# Patient Record
Sex: FEMALE | Race: BLACK OR AFRICAN AMERICAN | Employment: FULL TIME | URBAN - METROPOLITAN AREA
[De-identification: names, ages, dates, MRNs, and addresses within clinical notes are randomized per-mention and may not be internally consistent; named-entity substitution may affect disease eponyms.]

---

## 2017-01-16 ENCOUNTER — HOSPITAL ENCOUNTER (OUTPATIENT)
Dept: LAB | Age: 32
Discharge: HOME | End: 2017-01-16

## 2017-01-16 ENCOUNTER — OFFICE VISIT (OUTPATIENT)
Dept: INTERNAL MEDICINE CLINIC | Age: 32
End: 2017-01-16

## 2017-01-16 VITALS
BODY MASS INDEX: 31.08 KG/M2 | OXYGEN SATURATION: 100 % | HEART RATE: 73 BPM | HEIGHT: 67 IN | SYSTOLIC BLOOD PRESSURE: 134 MMHG | TEMPERATURE: 98 F | WEIGHT: 198 LBS | RESPIRATION RATE: 18 BRPM | DIASTOLIC BLOOD PRESSURE: 91 MMHG

## 2017-01-16 DIAGNOSIS — L02.91 ABSCESS: Primary | ICD-10-CM

## 2017-01-16 DIAGNOSIS — R03.0 ELEVATED BP WITHOUT DIAGNOSIS OF HYPERTENSION: ICD-10-CM

## 2017-01-16 DIAGNOSIS — N92.6 IRREGULAR MENSES: ICD-10-CM

## 2017-01-16 PROCEDURE — 99001 SPECIMEN HANDLING PT-LAB: CPT | Performed by: FAMILY MEDICINE

## 2017-01-16 NOTE — MR AVS SNAPSHOT
Visit Information Date & Time Provider Department Dept. Phone Encounter #  
 1/16/2017  2:00 PM Wang Tracy[facility]  [id number] Upcoming Health Maintenance Date Due DTaP/Tdap/Td series (1 - Tdap) 11/18/2005 PAP AKA CERVICAL CYTOLOGY 1/12/2019 Allergies as of 1/16/2017  Review Complete On: 1/16/2017 By: Wang Tracy MD  
 No Known Allergies Current Immunizations  Never Reviewed No immunizations on file. Not reviewed this visit You Were Diagnosed With   
  
 Codes Comments Abscess    -  Primary ICD-10-CM: L02.91 
ICD-9-CM: 682.9 Elevated BP without diagnosis of hypertension     ICD-10-CM: R03.0 ICD-9-CM: 796.2 Irregular menses     ICD-10-CM: N92.6 ICD-9-CM: 626.4 Vitals BP Pulse Temp Resp Height(growth percentile) Weight(growth percentile) (!) 134/91 (BP 1 Location: Left arm, BP Patient Position: Sitting) 73 98 °F (36.7 °C) (Oral) 18 5' 7\" (1.702 m) 198 lb (89.8 kg) SpO2 BMI OB Status Smoking Status 100% 31.01 kg/m2 Having regular periods Never Smoker Vitals History BMI and BSA Data Body Mass Index Body Surface Area 31.01 kg/m 2 2.06 m 2 Preferred Pharmacy Pharmacy Name Phone [facility] [address] [place] [address] [place] [phone] Your Updated Medication List  
  
   
This list is accurate as of: 1/16/17  2:26 PM.  Always use your most recent med list.  
  
  
  
  
 ethinyl estradiol-etonogestrel 0.12-0.015 mg/24 hr vaginal ring Commonly known as:  [place] Insert for 4 weeks and then remove and insert new ring We Performed the Following HEMOGLOBIN A1C WITH EAG [22655 CPT(R)] METABOLIC PANEL, BASIC [08786 CPT(R)] TSH 3RD GENERATION [01613 CPT(R)] Introducing [facility]!    
 Isabella Velasquez introduces [employer] patient portal. Now you can access parts of your medical record, email your doctor's office, and request medication refills online. 1. In your internet browser, go to [email or web address] 2. Click on the First Time User? Click Here link in the Sign In box. You will see the New Member Sign Up page. 3. Enter your [employer] Access Code exactly as it appears below. You will not need to use this code after youve completed the sign-up process. If you do not sign up before the expiration date, you must request a new code. · [employer] Access Code: [email or web address] Expires: 4/16/2017  1:46 PM 
 
4. Enter the last four digits of your Social Security Number (xxxx) and Date of Birth (mm/dd/yyyy) as indicated and click Submit. You will be taken to the next sign-up page. 5. Create a [employer]Room [employer] ID. This will be your [employer] login ID and cannot be changed, so think of one that is secure and easy to remember. 6. Create a [employer] password. You can change your password at any time. 7. Enter your Password Reset Question and Answer. This can be used at a later time if you forget your password. 8. Enter your e-mail address. You will receive e-mail notification when new information is available in [address] 9. Click Sign Up. You can now view and download portions of your medical record. 10. Click the Download Summary menu link to download a portable copy of your medical information. If you have questions, please visit the Frequently Asked Questions section of the [employer] website. Remember, [employer] is NOT to be used for urgent needs. For medical emergencies, dial 911. Now available from your iPhone and Android! Please provide this summary of care documentation to your next provider. Your primary care clinician is listed as Aniceto Habermann. If you have any questions after today's visit, please call [phone].

## 2017-01-16 NOTE — PROGRESS NOTES
1. Have you been to the ER, urgent care clinic since your last visit? Hospitalized since your last visit? No.    2. Have you seen or consulted any other health care providers outside of the [address] [place] [address] since your last visit? Include any pap smears or colon screening.  No.

## 2017-01-16 NOTE — PROGRESS NOTES
Ricardo Esparza is a 27 y.o. female and presents with New Patient and Establish Care  . 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 No CP, SOB, or edema. No h/o problems with her pregnancies (no HTN or GDM). On Nuvaring and cycles are still irreg. Sees gyn ([facility]). Been getting more boils than normal. No Fmhx of diabetes. Review of Systems  Constitutional: negative for fevers, chills, anorexia and weight loss  Eyes:   negative for visual disturbance and irritation  ENT:   negative for tinnitus,sore throat,nasal congestion,ear pains. hoarseness  Respiratory:  negative for cough, hemoptysis, dyspnea,wheezing  CV:   negative for chest pain, palpitations, lower extremity edema  GI:   negative for nausea, vomiting, diarrhea, abdominal pain,melena  Endo:               negative for polyuria,polydipsia,polyphagia,heat intolerance  Genitourinary: negative for frequency, dysuria and hematuria  Integument:  negative for rash and pruritus  Hematologic:  negative for easy bruising and gum/nose bleeding  Musculoskel: negative for myalgias, arthralgias, back pain, muscle weakness, joint pain  Neurological:  negative for headaches, dizziness, vertigo, memory problems and gait   Behavl/Psych: negative for feelings of anxiety, depression, mood changes    Past Medical History   Diagnosis Date    Endometriosis      Past Surgical History   Procedure Laterality Date    Pr abdomen surgery proc unlisted       cyst removal    Hx gyn       ovarian cysts     Social History     Social History    Marital status: SINGLE     Spouse name: N/A    Number of children: N/A    Years of education: N/A     Social History Main Topics    Smoking status: Never Smoker    Smokeless tobacco: Never Used    Alcohol use No      Comment: rarely    Drug use: No    Sexual activity: Yes     Partners: Male     Birth control/ protection: None     Other Topics Concern    None     Social History Narrative     Current Outpatient Prescriptions   Medication Sig Dispense Refill    ethinyl estradiol-etonogestrel (NUVARING) 0.12-0.015 mg/24 hr vaginal ring Insert for 4 weeks and then remove and insert new ring 1 Device 12     No Known Allergies    Objective:  Visit Vitals    BP (!) 134/91 (BP 1 Location: Left arm, BP Patient Position: Sitting)    Pulse 73    Temp 98 °F (36.7 °C) (Oral)    Resp 18    Ht 5' 7\" (1.702 m)    Wt 198 lb (89.8 kg)    SpO2 100%    BMI 31.01 kg/m2     Physical Exam:   General appearance - alert, well appearing, and in no distress  Mental status - alert, oriented to person, place, and time  Chest - clear to auscultation, no wheezes, rales or rhonchi, symmetric air entry   Heart - normal rate, regular rhythm, normal S1, S2, no murmurs, rubs, clicks or gallops   Abdomen - soft, nontender, nondistended, no masses or organomegaly  Lymph- no adenopathy palpable  Ext-peripheral pulses normal, no pedal edema, no clubbing or cyanosis  Skin-Warm and dry. no hyperpigmentation, vitiligo, or suspicious lesions  Neuro -alert, oriented, normal speech, no focal findings or movement disorder noted          Assessment/Plan:    ICD-10-CM ICD-9-CM    1. Abscess B55.52 467.6 METABOLIC PANEL, BASIC      HEMOGLOBIN A1C WITH EAG   2. Elevated BP without diagnosis of hypertension F47.0 698.3 METABOLIC PANEL, BASIC   3.  Irregular menses N92.6 626.4 TSH 3RD GENERATION     Orders Placed This Encounter    METABOLIC PANEL, BASIC    HEMOGLOBIN A1C WITH EAG    TSH 3RD GENERATION     Elevated Bp w/o dx of HTN- BMP  Irreg menses- TSH  Frequent abscess- BMP & HgbA1C    Follow-up Disposition: Not on File

## 2017-01-17 LAB
BUN SERPL-MCNC: 10 MG/DL (ref 6–20)
BUN/CREAT SERPL: 13 (ref 8–20)
CALCIUM SERPL-MCNC: 9.3 MG/DL (ref 8.7–10.2)
CHLORIDE SERPL-SCNC: 103 MMOL/L (ref 96–106)
CO2 SERPL-SCNC: 22 MMOL/L (ref 18–29)
CREAT SERPL-MCNC: 0.8 MG/DL (ref 0.57–1)
EST. AVERAGE GLUCOSE BLD GHB EST-MCNC: 114 MG/DL
GLUCOSE SERPL-MCNC: 82 MG/DL (ref 65–99)
HBA1C MFR BLD: 5.6 % (ref 4.8–5.6)
POTASSIUM SERPL-SCNC: 4.3 MMOL/L (ref 3.5–5.2)
SODIUM SERPL-SCNC: 142 MMOL/L (ref 134–144)
TSH SERPL DL<=0.005 MIU/L-ACNC: 1.22 UIU/ML (ref 0.45–4.5)

## 2017-03-24 ENCOUNTER — OFFICE VISIT (OUTPATIENT)
Dept: OBGYN CLINIC | Age: 32
End: 2017-03-24

## 2017-03-24 VITALS
TEMPERATURE: 97.3 F | HEIGHT: 67 IN | BODY MASS INDEX: 30.57 KG/M2 | DIASTOLIC BLOOD PRESSURE: 89 MMHG | HEART RATE: 81 BPM | SYSTOLIC BLOOD PRESSURE: 125 MMHG | RESPIRATION RATE: 18 BRPM | WEIGHT: 194.8 LBS

## 2017-03-24 DIAGNOSIS — M54.5 ACUTE LEFT-SIDED LOW BACK PAIN, WITH SCIATICA PRESENCE UNSPECIFIED: ICD-10-CM

## 2017-03-24 DIAGNOSIS — Z01.419 ENCOUNTER FOR WELL WOMAN EXAM WITH ROUTINE GYNECOLOGICAL EXAM: Primary | ICD-10-CM

## 2017-03-24 LAB
BILIRUB UR QL STRIP: NORMAL
GLUCOSE UR-MCNC: NEGATIVE MG/DL
KETONES P FAST UR STRIP-MCNC: NEGATIVE MG/DL
PH UR STRIP: 6 [PH] (ref 4.6–8)
PROT UR QL STRIP: NORMAL MG/DL
SP GR UR STRIP: 1.03 (ref 1–1.03)
UA UROBILINOGEN AMB POC: NORMAL (ref 0.2–1)
URINALYSIS CLARITY POC: NORMAL
URINALYSIS COLOR POC: NORMAL
URINE BLOOD POC: NEGATIVE
URINE LEUKOCYTES POC: NORMAL
URINE NITRITES POC: NEGATIVE

## 2017-03-24 NOTE — MR AVS SNAPSHOT
Visit Information Date & Time Provider Department Dept. Phone Encounter #  
 3/24/2017 10:15 AM Galilea Brambila [phone] [id number] Upcoming Health Maintenance Date Due  
 PAP AKA CERVICAL CYTOLOGY 1/12/2019 Allergies as of 3/24/2017  Review Complete On: 3/24/2017 By: Charly Huff MD  
 No Known Allergies Current Immunizations  Never Reviewed No immunizations on file. Not reviewed this visit You Were Diagnosed With   
  
 Codes Comments Acute left-sided low back pain, with sciatica presence unspecified    -  Primary ICD-10-CM: M54.5 ICD-9-CM: 724.2 Vitals BP Pulse Temp Resp Height(growth percentile) Weight(growth percentile) 125/89 (BP 1 Location: Left arm, BP Patient Position: Sitting) 81 97.3 °F (36.3 °C) (Oral) 18 5' 7\" (1.702 m) 194 lb 12.8 oz (88.4 kg) LMP BMI OB Status Smoking Status 03/15/2017 30.51 kg/m2 Having regular periods Never Smoker Vitals History BMI and BSA Data Body Mass Index Body Surface Area 30.51 kg/m 2 2.04 m 2 Preferred Pharmacy Pharmacy Name Phone [facility] [address] [facility] [address]  [place] [phone] Your Updated Medication List  
  
   
This list is accurate as of: 3/24/17 11:23 AM.  Always use your most recent med list.  
  
  
  
  
 AMOXICILLIN PO Take  by mouth.  
  
 ethinyl estradiol-etonogestrel 0.12-0.015 mg/24 hr vaginal ring Commonly known as:  [place] Insert for 4 weeks and then remove and insert new ring We Performed the Following AMB POC URINALYSIS DIP STICK MANUAL W/O MICRO [06518 CPT(R)] Introducing [facility]! Nickie Vigil introduces [employer] patient portal. Now you can access parts of your medical record, email your doctor's office, and request medication refills online.    
 
1. In your internet browser, go to [email or web address] 2. Click on the First Time User? Click Here link in the Sign In box. You will see the New Member Sign Up page. 3. Enter your Dittit Access Code exactly as it appears below. You will not need to use this code after youve completed the sign-up process. If you do not sign up before the expiration date, you must request a new code. · Dittit Access Code: [email or web address] Expires: 4/16/2017  2:46 PM 
 
4. Enter the last four digits of your Social Security Number (xxxx) and Date of Birth (mm/dd/yyyy) as indicated and click Submit. You will be taken to the next sign-up page. 5. Create a X Plus Two Solution[employer] ID. This will be your Dittit login ID and cannot be changed, so think of one that is secure and easy to remember. 6. Create a Dittit password. You can change your password at any time. 7. Enter your Password Reset Question and Answer. This can be used at a later time if you forget your password. 8. Enter your e-mail address. You will receive e-mail notification when new information is available in [address] 9. Click Sign Up. You can now view and download portions of your medical record. 10. Click the Download Summary menu link to download a portable copy of your medical information. If you have questions, please visit the Frequently Asked Questions section of the Dittit website. Remember, Dittit is NOT to be used for urgent needs. For medical emergencies, dial 911. Now available from your iPhone and Android! Please provide this summary of care documentation to your next provider. Your primary care clinician is listed as Evie Copeland. If you have any questions after today's visit, please call [phone].

## 2017-03-24 NOTE — PROGRESS NOTES
Chief Complaint   Patient presents with    Annual Exam     Pt wants to be checked for UTI. Pt complains of cramping/pain in lower back.

## 2017-03-27 NOTE — PROGRESS NOTES
SUBJECTIVE: Byron Ruvalcaba is a 27 y.o. female who presents desire for annual well woman exam. Patient's last menstrual period was 03/15/2017. GYN History  Dysmenorrhea:  YES  Contraception:  none  Sexually transmitted diseases/infections: denies  Urinary symptoms:  YES  Dyspareunia: NO    Last pap: NL, HPV positive    Past Medical History:   Diagnosis Date    Endometriosis        Past Surgical History:   Procedure Laterality Date    ABDOMEN SURGERY PROC UNLISTED      cyst removal    HX GYN      ovarian cysts       Family History   Problem Relation Age of Onset    Stroke Maternal Grandmother        Social History     Social History    Marital status: SINGLE     Spouse name: N/A    Number of children: N/A    Years of education: N/A     Occupational History    Not on file. Social History Main Topics    Smoking status: Never Smoker    Smokeless tobacco: Never Used    Alcohol use No      Comment: rarely    Drug use: No    Sexual activity: Yes     Partners: Male     Birth control/ protection: None     Other Topics Concern    Not on file     Social History Narrative       Current Outpatient Prescriptions   Medication Sig Dispense Refill    AMOXICILLIN PO Take  by mouth.  ethinyl estradiol-etonogestrel (NUVARING) 0.12-0.015 mg/24 hr vaginal ring Insert for 4 weeks and then remove and insert new ring 1 Device 12         Review of Systems:   Complete review of systems reviewed from social and history data forms. Pertinent positives in HPI.       Objective:     Visit Vitals    /89 (BP 1 Location: Left arm, BP Patient Position: Sitting)    Pulse 81    Temp 97.3 °F (36.3 °C) (Oral)    Resp 18    Ht 5' 7\" (1.702 m)    Wt 194 lb 12.8 oz (88.4 kg)    LMP 03/15/2017    BMI 30.51 kg/m2       General:  alert, cooperative, no distress, appears stated age   Skin:  Normal.   Lymph Nodes:  Cervical, supraclavicular, and axillary nodes normal.   Breast Exam: normal appearance, no masses or tenderness    Lungs:  clear to auscultation bilaterally   Heart:  regular rate and rhythm, S1, S2 normal, no murmur, click, rub or gallop   Abdomen: soft, non-tender. Bowel sounds normal. No masses,  no organomegaly   Back:  Costovertebral angle tenderness absent   Genitourinary: BUS normal. Introitus normal. Normal appearing vaginal epithelium, Vaginal discharge described as normal and physiologic.,  Normal cervix without lesions or tenderness, Uterus normal size anteverted. NT., Adnexa normal in size left and right without tenderness. Extremities:  extremities normal, atraumatic, no cyanosis or edema     Neurologic:  negative   Psychiatric:  non focal       ASSESSMENT:      ICD-10-CM ICD-9-CM    1. Encounter for well woman exam with routine gynecological exam Z01.419 V72.31 PAP IG, HPV AND RFX HPV 16/60,29([id number])      CHLAMYDIA / GC AMPLIFICATION   2. Acute left-sided low back pain, with sciatica presence unspecified M54.5 724.2 AMB POC URINALYSIS DIP STICK MANUAL W/O MICRO      CULTURE, URINE       Follow-up Disposition:  Return in about 1 year (around 3/24/2018) for annual exam.    Today's care was reviewed and discussed with the patient. She expresses understanding and approval of today's plan.       Tash Franks MD

## 2017-03-28 LAB
BACTERIA UR CULT: ABNORMAL
C TRACH RRNA SPEC QL NAA+PROBE: NEGATIVE
N GONORRHOEA RRNA SPEC QL NAA+PROBE: NEGATIVE

## 2017-03-28 RX ORDER — FLUCONAZOLE 150 MG/1
150 TABLET ORAL DAILY
Qty: 1 TAB | Refills: 0 | Status: SHIPPED | OUTPATIENT
Start: 2017-03-28 | End: 2017-03-29

## 2017-03-31 LAB
CYTOLOGIST CVX/VAG CYTO: ABNORMAL
CYTOLOGY CVX/VAG DOC THIN PREP: ABNORMAL
DX ICD CODE: ABNORMAL
DX ICD CODE: ABNORMAL
HPV I/H RISK 1 DNA CVX QL PROBE+SIG AMP: POSITIVE
HPV16 DNA CVX QL PROBE+SIG AMP: NEGATIVE
HPV18 DNA CVX QL PROBE+SIG AMP: NEGATIVE
Lab: ABNORMAL
OTHER STN SPEC: ABNORMAL
PATH REPORT.FINAL DX SPEC: ABNORMAL
STAT OF ADQ CVX/VAG CYTO-IMP: ABNORMAL

## 2017-04-30 ENCOUNTER — HOSPITAL ENCOUNTER (EMERGENCY)
Age: 32
Discharge: HOME | End: 2017-04-30
Attending: EMERGENCY MEDICINE | Admitting: EMERGENCY MEDICINE
Payer: COMMERCIAL

## 2017-04-30 VITALS
HEART RATE: 98 BPM | DIASTOLIC BLOOD PRESSURE: 70 MMHG | SYSTOLIC BLOOD PRESSURE: 130 MMHG | TEMPERATURE: 98.3 F | OXYGEN SATURATION: 99 % | RESPIRATION RATE: 18 BRPM

## 2017-04-30 DIAGNOSIS — Z87.42 HX OF ENDOMETRIOSIS: ICD-10-CM

## 2017-04-30 DIAGNOSIS — N30.00 ACUTE CYSTITIS WITHOUT HEMATURIA: Primary | ICD-10-CM

## 2017-04-30 LAB

## 2017-04-30 PROCEDURE — 87086 URINE CULTURE/COLONY COUNT: CPT | Performed by: EMERGENCY MEDICINE

## 2017-04-30 PROCEDURE — 96372 THER/PROPH/DIAG INJ SC/IM: CPT

## 2017-04-30 PROCEDURE — 81025 URINE PREGNANCY TEST: CPT

## 2017-04-30 PROCEDURE — 99283 EMERGENCY DEPT VISIT LOW MDM: CPT

## 2017-04-30 PROCEDURE — 81001 URINALYSIS AUTO W/SCOPE: CPT | Performed by: EMERGENCY MEDICINE

## 2017-04-30 PROCEDURE — 74011250636 HC RX REV CODE- 250/636: Performed by: PHYSICIAN ASSISTANT

## 2017-04-30 RX ORDER — CEPHALEXIN 500 MG/1
500 CAPSULE ORAL 3 TIMES DAILY
Qty: 21 CAP | Refills: 0 | Status: SHIPPED | OUTPATIENT
Start: 2017-04-30 | End: 2017-05-07

## 2017-04-30 RX ORDER — KETOROLAC TROMETHAMINE 30 MG/ML
30 INJECTION, SOLUTION INTRAMUSCULAR; INTRAVENOUS
Status: COMPLETED | OUTPATIENT
Start: 2017-04-30 | End: 2017-04-30

## 2017-04-30 RX ORDER — OXYCODONE AND ACETAMINOPHEN 5; 325 MG/1; MG/1
TABLET ORAL
COMMUNITY
End: 2017-04-30 | Stop reason: ALTCHOICE

## 2017-04-30 RX ORDER — TRAMADOL HYDROCHLORIDE 50 MG/1
50 TABLET ORAL
Qty: 12 TAB | Refills: 0 | Status: SHIPPED | OUTPATIENT
Start: 2017-04-30 | End: 2018-01-02

## 2017-04-30 RX ADMIN — KETOROLAC TROMETHAMINE 30 MG: 30 INJECTION, SOLUTION INTRAMUSCULAR at 21:15

## 2017-05-01 NOTE — DISCHARGE INSTRUCTIONS
Urinary Tract Infection in Women: Care Instructions  Your Care Instructions    A urinary tract infection, or UTI, is a general term for an infection anywhere between the kidneys and the urethra (where urine comes out). Most UTIs are bladder infections. They often cause pain or burning when you urinate. UTIs are caused by bacteria and can be cured with antibiotics. Be sure to complete your treatment so that the infection goes away. Follow-up care is a key part of your treatment and safety. Be sure to make and go to all appointments, and call your doctor if you are having problems. It's also a good idea to know your test results and keep a list of the medicines you take. How can you care for yourself at home? · Take your antibiotics as directed. Do not stop taking them just because you feel better. You need to take the full course of antibiotics. · Drink extra water and other fluids for the next day or two. This may help wash out the bacteria that are causing the infection. (If you have kidney, heart, or liver disease and have to limit fluids, talk with your doctor before you increase your fluid intake.)  · Avoid drinks that are carbonated or have caffeine. They can irritate the bladder. · Urinate often. Try to empty your bladder each time. · To relieve pain, take a hot bath or lay a heating pad set on low over your lower belly or genital area. Never go to sleep with a heating pad in place. To prevent UTIs  · Drink plenty of water each day. This helps you urinate often, which clears bacteria from your system. (If you have kidney, heart, or liver disease and have to limit fluids, talk with your doctor before you increase your fluid intake.)  · Urinate when you need to. · Urinate right after you have sex. · Change sanitary pads often. · Avoid douches, bubble baths, feminine hygiene sprays, and other feminine hygiene products that have deodorants.   · After going to the bathroom, wipe from front to back.  When should you call for help? Call your doctor now or seek immediate medical care if:  · Symptoms such as fever, chills, nausea, or vomiting get worse or appear for the first time. · You have new pain in your back just below your rib cage. This is called flank pain. · There is new blood or pus in your urine. · You have any problems with your antibiotic medicine. Watch closely for changes in your health, and be sure to contact your doctor if:  · You are not getting better after taking an antibiotic for 2 days. · Your symptoms go away but then come back. Where can you learn more? Go to http://aubrey-pranav.info/. Enter Z656 in the search box to learn more about \"Urinary Tract Infection in Women: Care Instructions. \"  Current as of: November 28, 2016  Content Version: 11.2  © 9726-8245 [employer]. Care instructions adapted under license by [employer] (which disclaims liability or warranty for this information). If you have questions about a medical condition or this instruction, always ask your healthcare professional. [address] any warranty or liability for your use of this information. Pelvic Pain: Care Instructions  Your Care Instructions    Pelvic pain, or pain in the lower belly, can have many causes. Often pelvic pain is not serious and gets better in a few days. If your pain continues or gets worse, you may need tests and treatment. Tell your doctor about any new symptoms. These may be signs of a serious problem. Follow-up care is a key part of your treatment and safety. Be sure to make and go to all appointments, and call your doctor if you are having problems. It's also a good idea to know your test results and keep a list of the medicines you take. How can you care for yourself at home? · Rest until you feel better. Lie down, and raise your legs by placing a pillow under your knees. · Drink plenty of fluids.  You may find that small, frequent sips are easier on your stomach than if you drink a lot at once. Avoid drinks with carbonation or caffeine, such as soda pop, tea, or coffee. · Try eating several small meals instead of 2 or 3 large ones. Eat mild foods, such as rice, dry toast or crackers, bananas, and applesauce. Avoid fatty and spicy foods, other fruits, and alcohol until 48 hours after your symptoms have gone away. · Take an over-the-counter pain medicine, such as acetaminophen (Tylenol), ibuprofen (Advil, Motrin), or naproxen (Aleve). Read and follow all instructions on the label. · Do not take two or more pain medicines at the same time unless the doctor told you to. Many pain medicines have acetaminophen, which is Tylenol. Too much acetaminophen (Tylenol) can be harmful. · You can put a heating pad, a warm cloth, or moist heat on your belly to relieve pain. When should you call for help? Call 911 anytime you think you may need emergency care. For example, call if:  · You passed out (lost consciousness). Call your doctor now or seek immediate medical care if:  · Your pain is getting worse. · Your pain becomes focused in one area of your belly. · You have severe vaginal bleeding. Severe means that you are soaking through your usual pads or tampons every hour for 2 or more hours and passing clots of blood. · You have new symptoms such as fever, urinary problems or unexpected vaginal bleeding. · You are dizzy or lightheaded, or you feel like you may faint. Watch closely for changes in your health, and be sure to contact your doctor if:  · You do not get better as expected. Where can you learn more? Go to http://aubrey-pranav.info/. Enter [phone] in the search box to learn more about \"Pelvic Pain: Care Instructions. \"  Current as of: October 13, 2016  Content Version: 11.2  © 4676-8003 [employer]Search[employer].  Care instructions adapted under license by where[employer]stand[employer] (which disclaims liability or warranty for this information). If you have questions about a medical condition or this instruction, always ask your healthcare professional. [place] [address] any warranty or liability for your use of this information.

## 2017-05-01 NOTE — ED NOTES
Discharge Instructions Reviewed with patient per this nurse. Discharge instructions given to patient per this nurse. Patient able to return verbalize discharge instructions. Paper copy of discharge instructions given. 1 RX given to patient with instructions for 1 Rx electronically sent to pharmacy on file per provider. Patient condition stable, Respiratory status WNL, Neurostatus intact.  Refused wheelchair, ambulatory out of er, to home with self

## 2017-05-01 NOTE — ED PROVIDER NOTES
Patient is a 27 y.o. female presenting with female genitourinary complaint. The history is provided by the patient. Vaginal Pain    This is a recurrent (pt reports hx of endometriosis and states that her pain usually resolves when her period ends. pt reports last menses 2wks ago but pain has been intermittent) problem. Episode onset: 2 wks pt states \"it feels like something is about to fall out\" Progression since onset: pt states she had an appt with GYN because of pain waxing and waning but reports pain resolved so she cancelled appt. but now it has returned. Context: Pt denies discharge and states she just had her annual well woman exam last month with no issues. She is not pregnant. She has not missed her period. Pertinent negatives include no fever, no abdominal pain, no nausea, no vomiting, no dysuria and no frequency. Her past medical history is significant for ovarian cysts. Past Medical History:   Diagnosis Date    Endometriosis        Past Surgical History:   Procedure Laterality Date    ABDOMEN SURGERY PROC UNLISTED      cyst removal    HX GYN      ovarian cysts         Family History:   Problem Relation Age of Onset    Stroke Maternal Grandmother        Social History     Social History    Marital status: SINGLE     Spouse name: N/A    Number of children: N/A    Years of education: N/A     Occupational History    Not on file. Social History Main Topics    Smoking status: Never Smoker    Smokeless tobacco: Never Used    Alcohol use No      Comment: rarely    Drug use: No    Sexual activity: Yes     Partners: Male     Birth control/ protection: None     Other Topics Concern    Not on file     Social History Narrative         ALLERGIES: Review of patient's allergies indicates no known allergies. Review of Systems   Constitutional: Negative for fever. Gastrointestinal: Negative for abdominal pain, nausea and vomiting. Genitourinary: Positive for pelvic pain and vaginal pain. Negative for difficulty urinating, dysuria, frequency, menstrual problem, vaginal bleeding and vaginal discharge. All other systems reviewed and are negative. Vitals:    04/30/17 2100   BP: 138/90   Pulse: (!) 108   Resp: 20   Temp: 98.3 °F (36.8 °C)   SpO2: 100%            Physical Exam   Constitutional: She is oriented to person, place, and time. She appears well-developed and well-nourished. No distress. HENT:   Head: Normocephalic and atraumatic. Eyes: Conjunctivae are normal.   Cardiovascular: Normal rate, regular rhythm and normal heart sounds. Pulmonary/Chest: Effort normal and breath sounds normal. No respiratory distress. She has no wheezes. She has no rales. Abdominal: Soft. Bowel sounds are normal. There is tenderness in the suprapubic area. There is no rigidity, no rebound and no guarding. Genitourinary:   Genitourinary Comments:  exam deferred   Musculoskeletal: Normal range of motion. Neurological: She is alert and oriented to person, place, and time. Skin: Skin is warm and dry. Psychiatric: She has a normal mood and affect. Her behavior is normal. Judgment and thought content normal.   Nursing note and vitals reviewed. MDM  Number of Diagnoses or Management Options  Diagnosis management comments: DDX: endometriosis, uterine fibroids, pregnancy, UTI    Progress Note:  9:24 PM  Discussed with pt the option of pelvic exam, pt has no concern for STD's, denies discharge and vaginal bleeding and reports exam done 1mo ago and for these reasons agrees to defer exam at this time but will f/u with GYN    Discharge Note:  9:33 PM  The patient is ready for discharge. The patient's signs, symptoms, diagnosis, and discharge instruction have been discussed and the patient has conveyed their understanding. The patient is to follow up with Dr Kenji Nicole tomorrow or return to the ER should their symptoms worsen. Plan has been discussed and the patient is in agreement.        Amount and/or Complexity of Data Reviewed  Clinical lab tests: ordered and reviewed  Decide to obtain previous medical records or to obtain history from someone other than the patient: yes  Review and summarize past medical records: yes      ED Course       Procedures

## 2017-05-01 NOTE — ED NOTES
Emergency Department Nursing Plan of Care       The Nursing Plan of Care is developed from the Nursing assessment and Emergency Department Attending provider initial evaluation. The plan of care may be reviewed in the ED Provider note.     The Plan of Care was developed with the following considerations:   Patient / Family readiness to learn indicated by:verbalized understanding  Persons(s) to be included in education: patient  Barriers to Learning/Limitations:No    Signed     Christina Diaz RN    4/30/2017   9:57 PM

## 2017-05-02 LAB
BACTERIA SPEC CULT: ABNORMAL
BACTERIA SPEC CULT: ABNORMAL
CC UR VC: ABNORMAL
SERVICE CMNT-IMP: ABNORMAL

## 2017-05-18 ENCOUNTER — OFFICE VISIT (OUTPATIENT)
Dept: OBGYN CLINIC | Age: 32
End: 2017-05-18

## 2017-05-18 VITALS
HEIGHT: 67 IN | WEIGHT: 193.6 LBS | RESPIRATION RATE: 16 BRPM | HEART RATE: 75 BPM | TEMPERATURE: 96.9 F | BODY MASS INDEX: 30.39 KG/M2 | DIASTOLIC BLOOD PRESSURE: 71 MMHG | SYSTOLIC BLOOD PRESSURE: 118 MMHG

## 2017-05-18 DIAGNOSIS — R30.9 PAINFUL URINATION: ICD-10-CM

## 2017-05-18 DIAGNOSIS — B37.41 YEAST CYSTITIS: ICD-10-CM

## 2017-05-18 DIAGNOSIS — N30.01 ACUTE CYSTITIS WITH HEMATURIA: Primary | ICD-10-CM

## 2017-05-18 LAB
BILIRUB UR QL STRIP: NEGATIVE
GLUCOSE UR-MCNC: NEGATIVE MG/DL
KETONES P FAST UR STRIP-MCNC: NEGATIVE MG/DL
PH UR STRIP: 8.5 [PH] (ref 4.6–8)
PROT UR QL STRIP: ABNORMAL MG/DL
SP GR UR STRIP: 1.02 (ref 1–1.03)
UA UROBILINOGEN AMB POC: ABNORMAL (ref 0.2–1)
URINALYSIS CLARITY POC: CLEAR
URINALYSIS COLOR POC: YELLOW
URINE BLOOD POC: ABNORMAL
URINE LEUKOCYTES POC: ABNORMAL
URINE NITRITES POC: NEGATIVE

## 2017-05-18 RX ORDER — SULFAMETHOXAZOLE AND TRIMETHOPRIM 800; 160 MG/1; MG/1
1 TABLET ORAL 2 TIMES DAILY
Qty: 6 TAB | Refills: 0 | Status: SHIPPED | OUTPATIENT
Start: 2017-05-18 | End: 2017-05-21

## 2017-05-18 RX ORDER — FLUCONAZOLE 150 MG/1
150 TABLET ORAL DAILY
Qty: 1 TAB | Refills: 0 | Status: SHIPPED | OUTPATIENT
Start: 2017-05-18 | End: 2017-05-19

## 2017-05-18 NOTE — PATIENT INSTRUCTIONS
Painful Urination (Dysuria): Care Instructions  Your Care Instructions  Burning pain with urination (dysuria) is a common symptom of a urinary tract infection or other urinary problems. The bladder may become inflamed. This can cause pain when the bladder fills and empties. You may also feel pain if the tube that carries urine from the bladder to the outside of the body (urethra) gets irritated or infected. Sexually transmitted infections (STIs) also may cause pain when you urinate. Sometimes the pain can be caused by things other than an infection. The urethra can be irritated by soaps, perfumes, or foreign objects in the urethra. Kidney stones can cause pain when they pass through the urethra. The cause may be hard to find. You may need tests. Treatment for painful urination depends on the cause. Follow-up care is a key part of your treatment and safety. Be sure to make and go to all appointments, and call your doctor if you are having problems. It's also a good idea to know your test results and keep a list of the medicines you take. How can you care for yourself at home? · Drink extra water for the next day or two. This will help make the urine less concentrated. (If you have kidney, heart, or liver disease and have to limit fluids, talk with your doctor before you increase the amount of fluids you drink.)  · Avoid drinks that are carbonated or have caffeine. They can irritate the bladder. · Urinate often. Try to empty your bladder each time. For women:  · Urinate right after you have sex. · After going to the bathroom, wipe from front to back. · Avoid douches, bubble baths, and feminine hygiene sprays. And avoid other feminine hygiene products that have deodorants. When should you call for help? Call your doctor now or seek immediate medical care if:  · You have new symptoms, such as fever, nausea, or vomiting. · You have new or worse symptoms of a urinary problem.  For example:  ¨ You have blood or pus in your urine. ¨ You have chills or body aches. ¨ It hurts worse to urinate. ¨ You have groin or belly pain. ¨ You have pain in your back just below your rib cage (the flank area). Watch closely for changes in your health, and be sure to contact your doctor if you have any problems. Where can you learn more? Go to http://aubrey-pranav.info/. Enter A519 in the search box to learn more about \"Painful Urination (Dysuria): Care Instructions. \"  Current as of: November 28, 2016  Content Version: 11.2  © 6502-5902 [employer]. Care instructions adapted under license by [employer] (which disclaims liability or warranty for this information). If you have questions about a medical condition or this instruction, always ask your healthcare professional. [address] any warranty or liability for your use of this information.

## 2017-05-18 NOTE — PROGRESS NOTES
Chief Complaint   Patient presents with    Vaginal Bleeding     Pt states recently was treated in April  for UTI at [facility]. Pt states she went in due to having pain in vaginal area and with urination. Pt states the pain then went away but can back on Monday. t stated she did have a cycle after the positive pregnancy. Pt states LMP 5/4/17. pregnancy test 5/3/17, was positive, then she took one after her cycle, it  was then negative.

## 2017-05-18 NOTE — PROGRESS NOTES
FOLLOW UP VISIT    SUBJECTIVE: Maryam Tubbs is a 27 y.o. female who presents to follow up from the ED. Treated a UTI,  curently feeling pretty well. But pain at end of urination. . Patient's last menstrual period was 05/04/2017. .    ROS: Pertinent items are noted in HPI. OBJECTIVE:     Visit Vitals    /71 (BP 1 Location: Right arm, BP Patient Position: Sitting)    Pulse 75    Temp 96.9 °F (36.1 °C) (Oral)    Resp 16    Ht 5' 7\" (1.702 m)    Wt 193 lb 9.6 oz (87.8 kg)    LMP 05/04/2017    BMI 30.32 kg/m2       General:  alert, cooperative, no distress, appears stated age   Skin:  Normal.   Extremities:  extremities normal, atraumatic, no cyanosis or edema   Neurologic:  negative   Psychiatric:  non focal       ASSESSMENT:      ICD-10-CM ICD-9-CM    1. Acute cystitis with hematuria N30.01 595.0 trimethoprim-sulfamethoxazole (BACTRIM DS, SEPTRA DS) 160-800 mg per tablet   2. Painful urination R30.9 788.1 AMB POC URINALYSIS DIP STICK MANUAL W/O MICRO      CULTURE, URINE   3. Yeast cystitis B37.41 112.2 fluconazole (DIFLUCAN) 150 mg tablet          Follow-up Disposition:  Return if symptoms worsen or fail to improve. Today's care was reviewed and discussed with the patient. She expresses understanding and approval of today's plan.     Ameena Monahan MD

## 2017-05-20 LAB — BACTERIA UR CULT: ABNORMAL

## 2018-01-02 ENCOUNTER — ROUTINE PRENATAL (OUTPATIENT)
Dept: OBGYN CLINIC | Age: 33
End: 2018-01-02

## 2018-01-02 VITALS
BODY MASS INDEX: 31.22 KG/M2 | WEIGHT: 206 LBS | HEIGHT: 68 IN | SYSTOLIC BLOOD PRESSURE: 124 MMHG | RESPIRATION RATE: 19 BRPM | DIASTOLIC BLOOD PRESSURE: 78 MMHG

## 2018-01-02 DIAGNOSIS — Z34.81 NORMAL PREGNANCY IN MULTIGRAVIDA IN FIRST TRIMESTER: Primary | ICD-10-CM

## 2018-01-02 PROBLEM — Z34.90 PREGNANT: Status: ACTIVE | Noted: 2018-01-02

## 2018-01-02 NOTE — PROGRESS NOTES
Current pregnancy history:    Deandre Sandoval is a 34 y.o. female who presents for the evaluation of pregnancy. Patient's last menstrual period was 10/18/2017 (approximate). LMP history:  The date of her LMP is an estimate. Her last menstrual period was normal and lasted for 4 to 5 days. A urine pregnancy test was positive 4 weeks ago. She was not on the pill at conception. Based on her LMP, her EDC is 2018 and her EGA is 10 weeks,6 days. Her menstrual cycles are regular and occur approximately every 28 days  and range from 3 to 5 days. The last menses lasted the usual number of days. Pregnancy symptoms:    Since her LMP she has experienced  urinary frequency, breast tenderness, and nausea. She has not been vomiting over the last few weeks. Associated signs and symptoms which she denies: dysuria, discharge, vaginal bleeding. She states she has not gained weight. Relevant past pregnancy history:   She has the following pregnancy history:Her last pregnancy was uncomplicated. She has no history of  delivery. Relevant past medical history:(relevant to this pregnancy): noncontributory. 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 Substance history: negative for alcohol, tobacco and street drugs. Positive for nothing. Exposure history: There is/are no indoor cat/s in the home. She admits close contact with children on a regular basis. She has had chicken pox or the vaccine in the past.   Patient denies issues with domestic violence. Genetic Screening/Teratology Counseling: (Includes patient, baby's father, or anyone in either family with:)  3.  Patient's age >/= 28 at [facility]?-- no  .   2.   Thalassemia ([facility], [facility], [address], or  background): MCV<80?--no.     3.  Neural tube defect (meningomyelocele, spina bifida, anencephaly)?--no.   4.  Congenital heart defect?--no.  5.  Down syndrome?--no.   6.  Frederick-Sachs (Religion, [place])?--no.   7.  Canavan's Disease?--no.   8.  Familial Dysautonomia?--no.   9.  Sickle cell disease or trait ()? --no   The patient has not been tested for sickle trait  10. Hemophilia or other blood disorders?--no. 11.  Muscular dystrophy?--no. 12.  Cystic fibrosis?--no. 13.  Calabash's Chorea?--no. 14.  Mental retardation/autism (if yes was person tested for Fragile X)?--no. 15.  Other inherited genetic or chromosomal disorder?--no. 12.  Maternal metabolic disorder (DM, PKU, etc)?--no. 17.  Patient or FOB with a child with a birth defect not listed above?--no.  17a. Patient or FOB with a birth defect themselves?--no. 18.  Recurrent pregnancy loss, or stillbirth?--no. 19.  Any medications since LMP other than prenatal vitamins (include vitamins,  supplements, OTC meds, drugs, alcohol)? --METRONIDOZOLE  20. Any other genetic/environmental exposure to discuss?--no. Infection History:  1. Lives with someone with TB or TB exposed?--no.   2.  Patient or partner has history of genital herpes?--no.  3.  Rash or viral illness since LMP?--no.    4.  History of STD (GC, CT, HPV, syphilis, HIV)? --no   5. Other: no    Past Medical History:   Diagnosis Date    Endometriosis      Past Surgical History:   Procedure Laterality Date    ABDOMEN SURGERY PROC UNLISTED      cyst removal    HX GYN      ovarian cysts     Social History     Occupational History    Not on file. Social History Main Topics    Smoking status: Never Smoker    Smokeless tobacco: Never Used    Alcohol use No      Comment: rarely    Drug use: No    Sexual activity: Yes     Partners: Male     Birth control/ protection: None     Family History   Problem Relation Age of Onset    Stroke Maternal Grandmother        No Known Allergies  Prior to Admission medications    Medication Sig Start Date End Date Taking?  Authorizing Provider PHENAZOPYRIDINE HCL (AZO PO) Take  by mouth. Historical Provider   traMADol (ULTRAM) 50 mg tablet Take 1 Tab by mouth every six (6) hours as needed for Pain.  Max Daily Amount: 200 mg. 4/30/17   Singh Barraza PA-C        Review of Systems: History obtained from the patient  Constitutional: negative for weight loss, fever, night sweats  HEENT: negative for hearing loss, earache, congestion, snoring, sorethroat  CV: negative for chest pain, palpitations, edema  Resp: negative for cough, shortness of breath, wheezing  Breast: negative for breast lumps, nipple discharge, galactorrhea  GI: negative for change in bowel habits, abdominal pain, black or bloody stools  : negative for frequency, dysuria, hematuria, vaginal discharge  MSK: negative for back pain, joint pain, muscle pain  Skin: negative for itching, rash, hives  Neuro: negative for dizziness, headache, confusion, weakness  Psych: negative for anxiety, depression, change in mood  Heme/lymph: negative for bleeding, bruising, pallor    Objective:  Visit Vitals    /78    Resp 19    Ht 5' 8\" (1.727 m)    Wt 206 lb (93.4 kg)    LMP 10/18/2017 (Approximate)    BMI 31.32 kg/m2       Physical Exam:   PHYSICAL EXAMINATION    Constitutional  · Appearance: well-nourished, well developed, alert, in no acute distress    HENT  · Head  · Face: appears normal  · Eyes: appear normal  · Ears: normal  · Mouth: normal  · Lips: no lesions    Neck  · Inspection/Palpation: normal appearance, no masses or tenderness  · Lymph Nodes: no lymphadenopathy present  · Thyroid: gland size normal, nontender, no nodules or masses present on palpation    Chest  · Respiratory Effort: breathing unlabored  · Auscultation: normal breath sounds    Cardiovascular  · Heart:  · Auscultation: regular rate and rhythm without murmur    Breasts  · Inspection of Breasts: breasts symmetrical, no skin changes, no discharge present, nipple appearance normal, no skin retraction present  · Palpation of Breasts and Axillae: no masses present on palpation, no breast tenderness  · Axillary Lymph Nodes: no lymphadenopathy present    Gastrointestinal  · Abdominal Examination: abdomen non-tender to palpation, normal bowel sounds, no masses present  · Liver and spleen: no hepatomegaly present, spleen not palpable  · Hernias: no hernias identified    Genitourinary  · External Genitalia: normal appearance for age, no discharge present, no tenderness present, no inflammatory lesions present, no masses present, no atrophy present  · Vagina: normal vaginal vault without central or paravaginal defects, no discharge present, no inflammatory lesions present, no masses present  · Bladder: non-tender to palpation  · Urethra: appears normal  · Cervix: normal   · Uterus: enlarged, normal shape, soft  · Adnexa: no adnexal tenderness present, no adnexal masses present  · Perineum: perineum within normal limits, no evidence of trauma, no rashes or skin lesions present  · Anus: anus within normal limits, no hemorrhoids present  · Inguinal Lymph Nodes: no lymphadenopathy present    Skin  · General Inspection: no rash, no lesions identified    Neurologic/Psychiatric  · Mental Status:  · Orientation: grossly oriented to person, place and time  · Mood and Affect: mood normal, affect appropriate    Assessment:   Intrauterine pregnancy with the following problems identified: none. Plan:     Offered CF testing, CVS, Nuchal Translucency, MSAFP, amnio, and discussed NIPT  Course of pregnancy discussed including visit schedule, routine U/S, glucola testing, etc.  Avoid alcoholic beverages and illicit/recreational drugs use  Take prenatal vitamins or folic acid daily. Hospital and practice style discussed with coverage system.   Discussed nutrition, toxoplasmosis precautions, sexual activity, exercise, need for influenza vaccine, environmental and work hazards, travel advice, screen for domestic violence, need for seat belts. Discussed seafood, unpasteurized dairy products, deli meat, artificial sweeteners, and caffeine. Information on prenatal classes/breastfeeding given. Information on circumcision given  Patient encouraged not to smoke. Discussed current prescription drug use. Given medication list.  Discussed the use of over the counter medications and chemicals. Pt understands risk of hemorrhage during pregnancy and post delivery and would accept blood products if necessary in life-threatening emergencies      Handouts given to pt.

## 2018-01-03 ENCOUNTER — HOSPITAL ENCOUNTER (OUTPATIENT)
Dept: PERINATAL CARE | Age: 33
Discharge: HOME | End: 2018-01-03
Attending: OBSTETRICS & GYNECOLOGY
Payer: COMMERCIAL

## 2018-01-03 PROCEDURE — 76815 OB US LIMITED FETUS(S): CPT | Performed by: OBSTETRICS & GYNECOLOGY

## 2018-01-04 ENCOUNTER — TELEPHONE (OUTPATIENT)
Dept: OBGYN CLINIC | Age: 33
End: 2018-01-04

## 2018-01-04 LAB
A VAGINAE DNA VAG QL NAA+PROBE: ABNORMAL SCORE
ABO GROUP BLD: NORMAL
BACTERIA UR CULT: ABNORMAL
BACTERIA UR CULT: ABNORMAL
BLD GP AB SCN SERPL QL: NEGATIVE
BVAB2 DNA VAG QL NAA+PROBE: ABNORMAL SCORE
C ALBICANS DNA VAG QL NAA+PROBE: POSITIVE
C GLABRATA DNA VAG QL NAA+PROBE: NEGATIVE
C TRACH RRNA SPEC QL NAA+PROBE: NEGATIVE
ERYTHROCYTE [DISTWIDTH] IN BLOOD BY AUTOMATED COUNT: 16.7 % (ref 12.3–15.4)
HBV SURFACE AG SERPL QL IA: NEGATIVE
HCT VFR BLD AUTO: 37.4 % (ref 34–46.6)
HGB A MFR BLD: 97.5 % (ref 96.4–98.8)
HGB A2 MFR BLD COLUMN CHROM: 2.5 % (ref 1.8–3.2)
HGB BLD-MCNC: 12.1 G/DL (ref 11.1–15.9)
HGB C MFR BLD: 0 %
HGB F MFR BLD: 0 % (ref 0–2)
HGB FRACT BLD-IMP: NORMAL
HGB OTHER MFR BLD HPLC: 0 %
HGB S BLD QL SOLY: NEGATIVE
HGB S MFR BLD: 0 %
HIV 1+2 AB+HIV1 P24 AG SERPL QL IA: NON REACTIVE
MCH RBC QN AUTO: 25.7 PG (ref 26.6–33)
MCHC RBC AUTO-ENTMCNC: 32.4 G/DL (ref 31.5–35.7)
MCV RBC AUTO: 79 FL (ref 79–97)
MEGA1 DNA VAG QL NAA+PROBE: ABNORMAL SCORE
N GONORRHOEA RRNA SPEC QL NAA+PROBE: NEGATIVE
PLATELET # BLD AUTO: 264 X10E3/UL (ref 150–379)
RBC # BLD AUTO: 4.71 X10E6/UL (ref 3.77–5.28)
RH BLD: POSITIVE
RUBV IGG SERPL IA-ACNC: 3.18 INDEX
T PALLIDUM AB SER QL IA: NEGATIVE
T VAGINALIS RRNA SPEC QL NAA+PROBE: NEGATIVE
WBC # BLD AUTO: 7.2 X10E3/UL (ref 3.4–10.8)

## 2018-01-04 NOTE — TELEPHONE ENCOUNTER
Pt returned call, information given per Dr. Jessica Nobles that her  Nuswab shows yeast. She can use OTC Monistat since she is pregnant. with understanding voiced. Pt voiced understanding.

## 2018-01-11 ENCOUNTER — HOSPITAL ENCOUNTER (OUTPATIENT)
Dept: PERINATAL CARE | Age: 33
Discharge: HOME | End: 2018-01-11
Attending: OBSTETRICS & GYNECOLOGY
Payer: COMMERCIAL

## 2018-01-11 PROCEDURE — 76813 OB US NUCHAL MEAS 1 GEST: CPT | Performed by: OBSTETRICS & GYNECOLOGY

## 2018-02-05 ENCOUNTER — ROUTINE PRENATAL (OUTPATIENT)
Dept: OBGYN CLINIC | Age: 33
End: 2018-02-05

## 2018-02-05 VITALS
WEIGHT: 208 LBS | BODY MASS INDEX: 31.63 KG/M2 | SYSTOLIC BLOOD PRESSURE: 111 MMHG | DIASTOLIC BLOOD PRESSURE: 63 MMHG | RESPIRATION RATE: 18 BRPM

## 2018-02-05 DIAGNOSIS — Z3A.15 15 WEEKS GESTATION OF PREGNANCY: Primary | ICD-10-CM

## 2018-02-05 NOTE — PATIENT INSTRUCTIONS
Weeks 14 to 18 of Your Pregnancy: Care Instructions  Your Care Instructions    During this time, you may start to \"show,\" so that you look pregnant to people around you. You may also notice some changes in your skin, such as itchy spots on your palms or acne on your face. Your baby is now able to pass urine, and your baby's first stool (meconium) is starting to collect in his or her intestines. Hair is also beginning to grow on your baby's head. At your next visit, between weeks 18 and 20, your doctor may do an ultrasound test. The test allows your doctor to check for certain problems. Your doctor can also tell the sex of your baby. This is a good time to think about whether you want to know whether your baby is a boy or a girl. Talk to your doctor about getting a flu shot to help keep you healthy during your pregnancy. As your pregnancy moves along, it is common to worry or feel anxious. Your body is changing a lot. And you are thinking about giving birth, the health of your baby, and becoming a parent. You can learn to cope with any anxiety and stress you feel. Follow-up care is a key part of your treatment and safety. Be sure to make and go to all appointments, and call your doctor if you are having problems. It's also a good idea to know your test results and keep a list of the medicines you take. How can you care for yourself at home? ?Reduce stress  ? · Ask for help with cooking and housekeeping. ? · Figure out who or what causes your stress. Avoid these people or situations as much as possible. ? · Relax every day. Taking 10- to 15-minute breaks can make a big difference. Take a walk, listen to music, or take a warm bath. ? · Learn relaxation techniques at prenatal or yoga class. Or buy a relaxation tape. ? · List your fears about having a baby and becoming a parent. Share the list with someone you trust. Decide which worries are really small, and try to let them go. Exercise  ?  · If you did not exercise much before pregnancy, start slowly. Walking is best. Allayne Arsenio yourself, and do a little more every day. ? · Brisk walking, easy jogging, low-impact aerobics, water aerobics, and yoga are good choices. Some sports, such as scuba diving, horseback riding, downhill skiing, gymnastics, and water skiing, are not a good idea. ? · Try to do at least 2½ hours a week of moderate exercise, such as a fast walk. One way to do this is to be active 30 minutes a day, at least 5 days a week. It's fine to be active in blocks of 10 minutes or more throughout your day and week. ? · Wear loose clothing. And wear shoes and a bra that provide good support. ? · Warm up and cool down to start and finish your exercise. ? · If you want to use weights, be sure to use light weights. They reduce stress on your joints. ?Stay at the best weight for you  ? · Experts recommend that you gain about 1 pound a month during the first 3 months of your pregnancy. ? · Experts recommend that you gain about 1 pound a week during your last 6 months of pregnancy, for a total weight gain of 25 to 35 pounds. ? · If you are underweight, you will need to gain more weight (about 28 to 40 pounds). ? · If you are overweight, you may not need to gain as much weight (about 15 to 25 pounds). ? · If you are gaining weight too fast, use common sense. Exercise every day, and limit sweets, fast foods, and fats. Choose lean meats, fruits, and vegetables. ? · If you are having twins or more, your doctor may refer you to a dietitian. Where can you learn more? Go to http://aubrey-pranav.info/. Enter G048 in the search box to learn more about \"Weeks 14 to 18 of Your Pregnancy: Care Instructions. \"  Current as of: March 16, 2017  Content Version: 11.4  © 4628-4634 [employer]. Care instructions adapted under license by [employer] (which disclaims liability or warranty for this information).  If you have questions about a medical condition or this instruction, always ask your healthcare professional. [place] [address] any warranty or liability for your use of this information.

## 2018-02-05 NOTE — PROGRESS NOTES
Return OB Visit    Pt doing well. States unable to detect FM, denies no LOF, no VB. Visit Vitals    /63 (BP 1 Location: Right arm, BP Patient Position: Sitting)    Resp 18    Wt 208 lb (94.3 kg)    LMP 10/18/2017 (Approximate)    BMI 31.63 kg/m2      See exam on prenatal record/reviewed     Plan routine OB care    RTO 4 weeks. No diagnosis found. All questions addressed. Pt. Voices understanding of treatment plan. Follow-up Disposition:  Return in about 1 month (around 3/5/2018) for Angelito Juárez [id number].       Haylie Rodrigues RN, [facility]

## 2018-02-05 NOTE — PROGRESS NOTES
Chief Complaint   Patient presents with    Routine Prenatal Visit     Patient presents in stable condition, complains of nose bleeds and bleeding gums. Denies other complaints.

## 2018-03-09 ENCOUNTER — ROUTINE PRENATAL (OUTPATIENT)
Dept: OBGYN CLINIC | Age: 33
End: 2018-03-09

## 2018-03-09 VITALS
WEIGHT: 213.4 LBS | DIASTOLIC BLOOD PRESSURE: 73 MMHG | HEIGHT: 68 IN | SYSTOLIC BLOOD PRESSURE: 115 MMHG | BODY MASS INDEX: 32.34 KG/M2 | HEART RATE: 81 BPM

## 2018-03-09 DIAGNOSIS — Z34.90 PRENATAL CARE, ANTEPARTUM: Primary | ICD-10-CM

## 2018-03-09 NOTE — MR AVS SNAPSHOT
[place] 
 
 
 \[facility]\"" [address] [place] [address] 
[phone] Patient: Cameron Negro MRN: [id number] Visit Information Date & Time Provider Department Dept. Phone Encounter #  
 3/9/2018 11:00 AM Claudell Fire, [place] [address] [facility] [address] [id number] Follow-up Instructions Return in about 2 weeks (around 3/23/2018) for TRACI. Upcoming Health Maintenance Date Due Influenza Age 5 to Adult 8/1/2017 PAP AKA CERVICAL CYTOLOGY 1/2/2021 Allergies as of 3/9/2018  Review Complete On: 3/9/2018 By: Claudell Fire, NP No Known Allergies Current Immunizations  Never Reviewed No immunizations on file. Not reviewed this visit Vitals BP Pulse Height(growth percentile) Weight(growth percentile) LMP BMI  
 115/73 (BP 1 Location: Right arm, BP Patient Position: Sitting) 81 5' 8\" (1.727 m) 213 lb 6.4 oz (96.8 kg) 10/18/2017 (Approximate) 32.45 kg/m2 OB Status Smoking Status Pregnant Never Smoker Vitals History BMI and BSA Data Body Mass Index Body Surface Area  
 32.45 kg/m 2 2.16 m 2 Preferred Pharmacy Pharmacy Name Phone [facility] [address]  [place] [phone] Your Updated Medication List  
  
   
This list is accurate as of 3/9/18 11:52 AM.  Always use your most recent med list.  
  
  
  
  
 PNV Combo #19-Iron-Fol Ac-DHA 22-6-1-200 mg Cap Take 1 Tab by mouth daily. Follow-up Instructions Return in about 2 weeks (around 3/23/2018) for TRACI. To-Do List   
 03/13/2018 9:30 AM  
  Appointment with ULTRASOUND 1 [facility] at [address] [place] [address] [address] ([phone]) Patient Instructions Weeks 18 to 22 of Your Pregnancy: Care Instructions Your Care Instructions Your baby is continuing to develop quickly. At this stage, babies can now suck their thumbs,  firmly with their hands, and open and close their eyelids. Sometime between 18 and 22 weeks, you will start to feel your baby move. At first, these small fetal movements feel like fluttering or \"butterflies. \" Some women say that they feel like gas bubbles. As the baby grows, these movements will become stronger. You may also notice that your baby kicks and hiccups. During this time, you may find that your nausea and fatigue are gone. Overall, you may feel better and have more energy than you did in your first trimester. But you may also have new discomforts now, such as sleep problems or leg cramps. This care sheet can help you ease these discomforts. Follow-up care is a key part of your treatment and safety. Be sure to make and go to all appointments, and call your doctor if you are having problems. It's also a good idea to know your test results and keep a list of the medicines you take. How can you care for yourself at home? Ease sleep problems · Avoid caffeine in drinks or chocolate late in the day. · Get some exercise every day. · Take a warm shower or bath before bed. · Have a light snack or glass of milk at bedtime. · Do relaxation exercises in bed to calm your mind and body. · Support your legs and back with extra pillows. Try a pillow between your legs if you sleep on your side. · Do not use sleeping pills or alcohol. They could harm your baby. Ease leg cramps · Do not massage your calf during the cramp. · Sit on a firm bed or chair. Straighten your leg, and bend your foot (flex your ankle) slowly upward, toward your knee. Bend your toes up and down. · Stand on a cool, flat surface. Stretch your toes upward, and take small steps walking on your heels. · Use a heating pad or hot water bottle to help with muscle ache. Prevent leg cramps · Be sure to get enough calcium. If you are worried that you are not getting enough, talk to your doctor. · Exercise every day, and stretch your legs before bed. · Take a warm bath before bed, and try leg warmers at night. Where can you learn more? Go to http://aubrey-pranav.info/. Enter M927 in the search box to learn more about \"Weeks 18 to 22 of Your Pregnancy: Care Instructions. \" Current as of: March 16, 2017 Content Version: 11.4 © 1391-7035 Lailaihui. Care instructions adapted under license by [employer] (which disclaims liability or warranty for this information). If you have questions about a medical condition or this instruction, always ask your healthcare professional. [address] any warranty or liability for your use of this information. Introducing [facility]! Lindsey Lubin introduces Snatch that Jer[employer] patient portal. Now you can access parts of your medical record, email your doctor's office, and request medication refills online. 1. In your internet browser, go to [email or web address] 2. Click on the First Time User? Click Here link in the Sign In box. You will see the New Member Sign Up page. 3. Enter your Snatch that Jerky Access Code exactly as it appears below. You will not need to use this code after youve completed the sign-up process. If you do not sign up before the expiration date, you must request a new code. · Snatch that Jerky Access Code: [email or web address] Expires: 4/2/2018  2:43 PM 
 
4. Enter the last four digits of your Social Security Number (xxxx) and Date of Birth (mm/dd/yyyy) as indicated and click Submit. You will be taken to the next sign-up page. 5. Create a Snatch that Jerky ID. This will be your Snatch that Jerky login ID and cannot be changed, so think of one that is secure and easy to remember. 6. Create a Universal Studios Japant password. You can change your password at any time. 7. Enter your Password Reset Question and Answer. This can be used at a later time if you forget your password. 8. Enter your e-mail address. You will receive e-mail notification when new information is available in [address] 9. Click Sign Up. You can now view and download portions of your medical record. 10. Click the Download Summary menu link to download a portable copy of your medical information. If you have questions, please visit the Frequently Asked Questions section of the [employer] website. Remember, [employer] is NOT to be used for urgent needs. For medical emergencies, dial 911. Now available from your iPhone and Android! Please provide this summary of care documentation to your next provider. Your primary care clinician is listed as Phys Other. If you have any questions after today's visit, please call [phone].

## 2018-03-09 NOTE — PROGRESS NOTES
Return OB Visit    Pt doing OK- does report some RLQ pain particularly with standing. Does relieve with SSawell. States good FM, no LOF, no VB. Visit Vitals    /73 (BP 1 Location: Right arm, BP Patient Position: Sitting)    Pulse 81    Ht 5' 8\" (1.727 m)    Wt 213 lb 6.4 oz (96.8 kg)    LMP 10/18/2017 (Approximate)    BMI 32.45 kg/m2      See exam on prenatal record/reviewed     Plan routine OB care  Probable round ligament pain. Heat and local measures advised. 20 week ultrasound scheduled. Denies any PTL symptoms. All questions addressed. Pt. Voices understanding of treatment plan. Follow-up Disposition:  Return in about 2 weeks (around 3/23/2018) for Angelito Juárez 9038.       Sandra Guzman RN, [facility]

## 2018-03-09 NOTE — PROGRESS NOTES
Chief Complaint   Patient presents with    Routine Prenatal Visit     Pt states on Tuesday and Wednesay had sharp cramps on lower right side. Pt states pain is a lot better now comes/goes.  Leuk--1+

## 2018-03-09 NOTE — PATIENT INSTRUCTIONS

## 2018-03-13 ENCOUNTER — HOSPITAL ENCOUNTER (OUTPATIENT)
Dept: PERINATAL CARE | Age: 33
Discharge: HOME | End: 2018-03-13
Attending: OBSTETRICS & GYNECOLOGY
Payer: COMMERCIAL

## 2018-03-13 PROCEDURE — 76811 OB US DETAILED SNGL FETUS: CPT | Performed by: OBSTETRICS & GYNECOLOGY

## 2018-03-23 ENCOUNTER — ROUTINE PRENATAL (OUTPATIENT)
Dept: OBGYN CLINIC | Age: 33
End: 2018-03-23

## 2018-03-23 VITALS
DIASTOLIC BLOOD PRESSURE: 80 MMHG | WEIGHT: 215.4 LBS | HEIGHT: 68 IN | BODY MASS INDEX: 32.64 KG/M2 | HEART RATE: 80 BPM | SYSTOLIC BLOOD PRESSURE: 122 MMHG

## 2018-03-23 DIAGNOSIS — Z34.92 PRENATAL CARE IN SECOND TRIMESTER: Primary | ICD-10-CM

## 2018-03-23 NOTE — MR AVS SNAPSHOT
Cedrick Delacruz 
 
 
 [place] [address] 
[phone] Patient: Rene Espinosa MRN: [id number] Visit Information Date & Time Provider Department Dept. Phone Encounter #  
 3/23/2018 10:00 AM [facility] [address] [facility] AT [address] [facility] [id number] 4/16/2018  9:30 AM  
OB VISIT with Ja Nubia, NP  
[facility] AT [facility] ([facility]) Appt Note: ob visit [place] [address] [place] [address] [phone]  
[phone]  
  
   
 [place] [address] Upcoming Health Maintenance Date Due Influenza Age 5 to Adult 8/1/2017 PAP AKA CERVICAL CYTOLOGY 1/2/2021 Allergies as of 3/23/2018  Review Complete On: 3/23/2018 By: Ja Delacruz NP No Known Allergies Current Immunizations  Never Reviewed No immunizations on file. Not reviewed this visit Vitals BP Pulse Height(growth percentile) Weight(growth percentile) LMP BMI  
 122/80 80 5' 8\" (1.727 m) 215 lb 6.4 oz (97.7 kg) 10/18/2017 (Approximate) 32.75 kg/m2 OB Status Smoking Status Pregnant Never Smoker Vitals History BMI and BSA Data Body Mass Index Body Surface Area 32.75 kg/m 2 2.17 m 2 Preferred Pharmacy Pharmacy Name Phone [facility] [address]  [place] [phone] Your Updated Medication List  
  
   
This list is accurate as of 3/23/18 10:37 AM.  Always use your most recent med list.  
  
  
  
  
 PNV Combo #19-Iron-Fol Ac-DHA 22-6-1-200 mg Cap Take 1 Tab by mouth daily. Patient Instructions Learning About Pregnancy Your Care Instructions Your health in the early weeks of your pregnancy is particularly important for your baby's health. Take good care of yourself.  Anything you do that harms your body can also harm your baby. Make sure to go to all of your doctor appointments. Regular checkups will help keep you and your baby healthy. How can you care for yourself at home? Diet ? · Eat a balanced diet. Make sure your diet includes plenty of beans, peas, and leafy green vegetables. ? · Do not skip meals or go for many hours without eating. If you are nauseated, try to eat a small, healthy snack every 2 to 3 hours. ? · Do not eat fish that has a high level of mercury, such as shark, swordfish, or mackerel. Do not eat more than one can of tuna each week. ? · Drink plenty of fluids, enough so that your urine is light yellow or clear like water. If you have kidney, heart, or liver disease and have to limit fluids, talk with your doctor before you increase the amount of fluids you drink. ? · Cut down on caffeine, such as coffee, tea, and cola. ? · Do not drink alcohol, such as beer, wine, or hard liquor. ? · Take a multivitamin that contains at least 400 micrograms (mcg) of folic acid to help prevent birth defects. Fortified cereal and whole wheat bread are good additional sources of folic acid. ? · Increase the calcium in your diet. Try to drink a quart of skim milk each day. You may also take calcium supplements and choose foods such as cheese and yogurt. ? Lifestyle ? · Make sure you go to your follow-up appointments. ? · Get plenty of rest. You may be unusually tired while you are pregnant. ? · Get at least 30 minutes of exercise on most days of the week. Walking is a good choice. If you have not exercised in the past, start out slowly. Take several short walks each day. ? · Do not smoke. If you need help quitting, talk to your doctor about stop-smoking programs. These can increase your chances of quitting for good. ? · Do not touch cat feces or litter boxes. Also, wash your hands after you handle raw meat, and fully cook all meat before you eat it.  Wear gloves when you work in the yard or garden, and wash your hands well when you are done. Cat feces, raw or undercooked meat, and contaminated dirt can cause an infection that may harm your baby or lead to a miscarriage. ? · Do not use saunas or hot tubs. Raising your body temperature may harm your baby. ? · Avoid chemical fumes, paint fumes, or poisons. ? · Do not use illegal drugs or alcohol. Medicines ? · Review all of your medicines with your doctor. Some of your routine medicines may need to be changed to protect your baby. ? · Use acetaminophen (Tylenol) to relieve minor problems, such as a mild headache or backache or a mild fever with cold symptoms. Do not use nonsteroidal anti-inflammatory drugs (NSAIDs), such as ibuprofen (Advil, Motrin) or naproxen (Aleve), unless your doctor says it is okay. ? · Do not take two or more pain medicines at the same time unless the doctor told you to. Many pain medicines have acetaminophen, which is Tylenol. Too much acetaminophen (Tylenol) can be harmful. ? · Take your medicines exactly as prescribed. Call your doctor if you think you are having a problem with your medicine. ?To manage morning sickness ? · If you feel sick when you first wake up, try eating a small snack (such as crackers) before you get out of bed. Allow some time to digest the snack, and then get out of bed slowly. ? · Do not skip meals or go for long periods without eating. An empty stomach can make nausea worse. ? · Eat small, frequent meals instead of three large meals each day. ? · Drink plenty of fluids. Sports drinks, such as Gatorade or Powerade, are good choices. ? · Eat foods that are high in protein but low in fat. ? · If you are taking iron supplements, ask your doctor if they are necessary. Iron can make nausea worse. ? · Avoid any smells, such as coffee, that make you feel sick. ? · Get lots of rest. Morning sickness may be worse when you are tired. Follow-up care is a key part of your treatment and safety. Be sure to make and go to all appointments, and call your doctor if you are having problems. It's also a good idea to know your test results and keep a list of the medicines you take. Where can you learn more? Go to http://aubrey-pranav.info/. Enter C537 in the search box to learn more about \"Learning About Pregnancy. \" Current as of: March 16, 2017 Content Version: 11.4 © 2396-9762 Levanta. Care instructions adapted under license by [employer] (which disclaims liability or warranty for this information). If you have questions about a medical condition or this instruction, always ask your healthcare professional. [address] any warranty or liability for your use of this information. Introducing [facility]! [facility] introduces Versa patient portal. Now you can access parts of your medical record, email your doctor's office, and request medication refills online. 1. In your internet browser, go to [email or web address] 2. Click on the First Time User? Click Here link in the Sign In box. You will see the New Member Sign Up page. 3. Enter your Versa Access Code exactly as it appears below. You will not need to use this code after youve completed the sign-up process. If you do not sign up before the expiration date, you must request a new code. · Versa Access Code: [email or web address] Expires: 4/2/2018  3:43 PM 
 
4. Enter the last four digits of your Social Security Number (xxxx) and Date of Birth (mm/dd/yyyy) as indicated and click Submit. You will be taken to the next sign-up page. 5. Create a StoneRivert ID. This will be your Versa login ID and cannot be changed, so think of one that is secure and easy to remember. 6. Create a StoneRivert password. You can change your password at any time. 7. Enter your Password Reset Question and Answer. This can be used at a later time if you forget your password. 8. Enter your e-mail address. You will receive e-mail notification when new information is available in [address] 9. Click Sign Up. You can now view and download portions of your medical record. 10. Click the Download Summary menu link to download a portable copy of your medical information. If you have questions, please visit the Frequently Asked Questions section of the [employer] website. Remember, [employer]e is NOT to be used for urgent needs. For medical emergencies, dial 911. Now available from your iPhone and Android! Please provide this summary of care documentation to your next provider. Your primary care clinician is listed as Phys Other. If you have any questions after today's visit, please call [phone].

## 2018-03-23 NOTE — PROGRESS NOTES
Pt is here for a routine prenatal visit. No complaints. Urine dip shows 1+ bilirubin, trace ketones and trace protein.

## 2018-03-23 NOTE — PATIENT INSTRUCTIONS
Learning About Pregnancy  Your Care Instructions    Your health in the early weeks of your pregnancy is particularly important for your baby's health. Take good care of yourself. Anything you do that harms your body can also harm your baby. Make sure to go to all of your doctor appointments. Regular checkups will help keep you and your baby healthy. How can you care for yourself at home? Diet  ? · Eat a balanced diet. Make sure your diet includes plenty of beans, peas, and leafy green vegetables. ? · Do not skip meals or go for many hours without eating. If you are nauseated, try to eat a small, healthy snack every 2 to 3 hours. ? · Do not eat fish that has a high level of mercury, such as shark, swordfish, or mackerel. Do not eat more than one can of tuna each week. ? · Drink plenty of fluids, enough so that your urine is light yellow or clear like water. If you have kidney, heart, or liver disease and have to limit fluids, talk with your doctor before you increase the amount of fluids you drink. ? · Cut down on caffeine, such as coffee, tea, and cola. ? · Do not drink alcohol, such as beer, wine, or hard liquor. ? · Take a multivitamin that contains at least 400 micrograms (mcg) of folic acid to help prevent birth defects. Fortified cereal and whole wheat bread are good additional sources of folic acid. ? · Increase the calcium in your diet. Try to drink a quart of skim milk each day. You may also take calcium supplements and choose foods such as cheese and yogurt. ? Lifestyle  ? · Make sure you go to your follow-up appointments. ? · Get plenty of rest. You may be unusually tired while you are pregnant. ? · Get at least 30 minutes of exercise on most days of the week. Walking is a good choice. If you have not exercised in the past, start out slowly. Take several short walks each day. ? · Do not smoke. If you need help quitting, talk to your doctor about stop-smoking programs.  These can increase your chances of quitting for good. ? · Do not touch cat feces or litter boxes. Also, wash your hands after you handle raw meat, and fully cook all meat before you eat it. Wear gloves when you work in the yard or garden, and wash your hands well when you are done. Cat feces, raw or undercooked meat, and contaminated dirt can cause an infection that may harm your baby or lead to a miscarriage. ? · Do not use saunas or hot tubs. Raising your body temperature may harm your baby. ? · Avoid chemical fumes, paint fumes, or poisons. ? · Do not use illegal drugs or alcohol. Medicines  ? · Review all of your medicines with your doctor. Some of your routine medicines may need to be changed to protect your baby. ? · Use acetaminophen (Tylenol) to relieve minor problems, such as a mild headache or backache or a mild fever with cold symptoms. Do not use nonsteroidal anti-inflammatory drugs (NSAIDs), such as ibuprofen (Advil, Motrin) or naproxen (Aleve), unless your doctor says it is okay. ? · Do not take two or more pain medicines at the same time unless the doctor told you to. Many pain medicines have acetaminophen, which is Tylenol. Too much acetaminophen (Tylenol) can be harmful. ? · Take your medicines exactly as prescribed. Call your doctor if you think you are having a problem with your medicine. ?To manage morning sickness  ? · If you feel sick when you first wake up, try eating a small snack (such as crackers) before you get out of bed. Allow some time to digest the snack, and then get out of bed slowly. ? · Do not skip meals or go for long periods without eating. An empty stomach can make nausea worse. ? · Eat small, frequent meals instead of three large meals each day. ? · Drink plenty of fluids. Sports drinks, such as Gatorade or Powerade, are good choices. ? · Eat foods that are high in protein but low in fat.    ? · If you are taking iron supplements, ask your doctor if they are necessary. Iron can make nausea worse. ? · Avoid any smells, such as coffee, that make you feel sick. ? · Get lots of rest. Morning sickness may be worse when you are tired. Follow-up care is a key part of your treatment and safety. Be sure to make and go to all appointments, and call your doctor if you are having problems. It's also a good idea to know your test results and keep a list of the medicines you take. Where can you learn more? Go to http://aubrey-pranav.info/. Enter F477 in the search box to learn more about \"Learning About Pregnancy. \"  Current as of: March 16, 2017  Content Version: 11.4  © 2965-6864 Healthwise, [employer]. Care instructions adapted under license by [employer] (which disclaims liability or warranty for this information). If you have questions about a medical condition or this instruction, always ask your healthcare professional. [address] any warranty or liability for your use of this information.

## 2018-03-23 NOTE — PROGRESS NOTES
Return OB Visit    Pt doing well. Recent ultrasound - baby transverse, growth appropriate. Denies PTL signs. States good FM, no LOF, no VB. 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 All questions addressed. Pt. Voices understanding of treatment plan.     Follow-up Disposition: Not on [address] [facility] [place], RN, [facility]

## 2018-04-16 ENCOUNTER — ROUTINE PRENATAL (OUTPATIENT)
Dept: OBGYN CLINIC | Age: 33
End: 2018-04-16

## 2018-04-16 VITALS
WEIGHT: 217 LBS | HEIGHT: 68 IN | BODY MASS INDEX: 32.89 KG/M2 | DIASTOLIC BLOOD PRESSURE: 68 MMHG | SYSTOLIC BLOOD PRESSURE: 116 MMHG | HEART RATE: 89 BPM

## 2018-04-16 DIAGNOSIS — Z3A.25 25 WEEKS GESTATION OF PREGNANCY: Primary | ICD-10-CM

## 2018-04-16 NOTE — PROGRESS NOTES
Return OB Visit    Pt doing OK. Reports some vagal episodes. Discussed management strategies. States good FM, no LOF, no VB. Visit Vitals    /68 (BP 1 Location: Right arm, BP Patient Position: Sitting)    Pulse 89    Ht 5' 8\" (1.727 m)    Wt 217 lb (98.4 kg)    LMP 10/18/2017 (Approximate)    BMI 32.99 kg/m2      See exam on prenatal record/reviewed     Plan routine OB care        ICD-10-CM ICD-9-CM    1. 25 weeks gestation of pregnancy Z3A.25 V22.2 GESTATIONAL (GLUCOSE)DIAB. SCRN      CBC WITH AUTOMATED DIFF   Plan:  1 hr. GTT with CBC at next visit. RTO 2 weeks. All questions addressed. Pt. Voices understanding of treatment plan. Follow-up Disposition:  Return in about 2 weeks (around 4/30/2018) for TRACI with 1 hr. GTT.       Elian Jaimes RN, [facility]

## 2018-04-16 NOTE — PROGRESS NOTES
Chief Complaint   Patient presents with    Routine Prenatal Visit     Pt states while at work recently pt stated she got \"hot, lightheaded and threw up,\" BP was ok. Pt states she had another episode where she was dizzy, BP was low approximately 80/5s (pt unsure of exact BP). Leuk -Trace. These episodes happened within 1 1/2 weeks.

## 2018-04-16 NOTE — PATIENT INSTRUCTIONS
Learning About Glucose Testing During Pregnancy  What is a glucose test?    A glucose test measures the body's ability to use a type of sugar, called glucose. Glucose is the body's main source of energy. This test is used to check pregnant women for gestational diabetes. Gestational diabetes is a form of diabetes that develops during pregnancy and then usually goes away after the baby is born. When you have this condition, the insulin in your body is not able to keep your blood sugar in a normal range. If you do not control your blood sugar, your baby can grow too big and may have problems after birth. How is a glucose test done? There are different ways to test for gestational diabetes. One method is done in two steps. 1. You do not need to stop eating or drinking before the first step. You will drink a liquid that contains 50 grams of sugar (glucose). Your blood sample is taken 1 hour later. If you don't have a lot of sugar in your blood, you do not have gestational diabetes. 2. If you do have a lot of sugar in your blood, you are asked to do the second step, the oral glucose tolerance test (OGTT). With the OGTT, you cannot eat or drink for at least 8 hours before the test. Then a blood sample is taken when you arrive for the test. This is your fasting blood glucose value. It provides a baseline for comparing other glucose values. You drink a liquid that contains 100 grams of sugar (glucose). Your blood sample is taken 3 hours later to see how much sugar is in your blood. If you don't have a lot of sugar in your blood, you don't have gestational diabetes. If you do have a lot of sugar in your blood, you may have gestational diabetes. A different method is done in one step. It is another version of the OGTT. You cannot eat or drink for at least 8 hours before the test. Then a blood sample is taken when you arrive for the test. This is your fasting blood glucose value.  It provides a baseline for comparing other glucose values. You drink a liquid that contains 75 grams of sugar (glucose). Your blood sample is taken 1 and then 2 hours later to see how much sugar is in your blood. If you don't have a lot of sugar in your blood, you do not have gestational diabetes. If you do have a lot of sugar in your blood, you may have gestational diabetes. What else should you know about the test?  Your blood glucose level may drop very low toward the end of the glucose test. If this happens, you may feel weak, hungry, and restless. Tell your doctor if you have these symptoms. The test usually will be stopped. You may vomit after drinking the sweet liquid. If this happens, the test may need to be repeated at a later time. Your doctor may do more glucose tests at different times during your pregnancy. Follow-up care is a key part of your treatment and safety. Be sure to make and go to all appointments, and call your doctor if you are having problems. It's also a good idea to know your test results and keep a list of the medicines you take. Where can you learn more? Go to http://aubrey-pranav.info/. Enter V767 in the search box to learn more about \"Learning About Glucose Testing During Pregnancy. \"  Current as of: March 13, 2017  Content Version: 11.4  © 2192-2818 Healthwise, Incorporated. Care instructions adapted under license by BaubleBar (which disclaims liability or warranty for this information). If you have questions about a medical condition or this instruction, always ask your healthcare professional. [place] [address] any warranty or liability for your use of this information.

## 2018-04-16 NOTE — MR AVS SNAPSHOT
[address] [facility] [address] 
[phone] Patient: Bony Mckeon MRN: [id number] AXK:[id number] Visit Information Date & Time Provider Department Dept. Phone Encounter #  
 4/16/2018  9:30 AM Justino Neil NP [facility] OBGYN AT [address] [id number] Follow-up Instructions Return in about 2 weeks (around 4/30/2018) for TRACI with 1 hr. GTT. Upcoming Health Maintenance Date Due Influenza Age 5 to Adult 8/1/2017 PAP AKA CERVICAL CYTOLOGY 1/2/2021 Allergies as of 4/16/2018  Review Complete On: 4/16/2018 By: Justino Neil NP No Known Allergies Current Immunizations  Never Reviewed No immunizations on file. Not reviewed this visit You Were Diagnosed With   
  
 Codes Comments 25 weeks gestation of pregnancy    -  Primary ICD-10-CM: Z3A.25 
ICD-9-CM: V22.2 Vitals BP Pulse Height(growth percentile) Weight(growth percentile) LMP BMI  
 116/68 (BP 1 Location: Right arm, BP Patient Position: Sitting) 89 5' 8\" (1.727 m) 217 lb (98.4 kg) 10/18/2017 (Approximate) 32.99 kg/m2 OB Status Smoking Status Pregnant Never Smoker Vitals History BMI and BSA Data Body Mass Index Body Surface Area  
 32.99 kg/m 2 2.17 m 2 Preferred Pharmacy Pharmacy Name Phone [facility]  [place] [phone] Your Updated Medication List  
  
   
This list is accurate as of 4/16/18 10:12 AM.  Always use your most recent med list.  
  
  
  
  
 PNV Combo #19-Iron-Fol Ac-DHA 22-6-1-200 mg Cap Take 1 Tab by mouth daily. We Performed the Following CBC WITH AUTOMATED DIFF [29719 CPT(R)] GESTATIONAL (GLUCOSE)DIAB. SCRN [46675 CPT(R)] Follow-up Instructions Return in about 2 weeks (around 4/30/2018) for TRACI with 1 hr. GTT. Patient Instructions Learning About Glucose Testing During Pregnancy What is a glucose test? 
 
A glucose test measures the body's ability to use a type of sugar, called glucose. Glucose is the body's main source of energy. This test is used to check pregnant women for gestational diabetes. Gestational diabetes is a form of diabetes that develops during pregnancy and then usually goes away after the baby is born. When you have this condition, the insulin in your body is not able to keep your blood sugar in a normal range. If you do not control your blood sugar, your baby can grow too big and may have problems after birth. How is a glucose test done? There are different ways to test for gestational diabetes. One method is done in two steps. 1. You do not need to stop eating or drinking before the first step. You will drink a liquid that contains 50 grams of sugar (glucose). Your blood sample is taken 1 hour later. If you don't have a lot of sugar in your blood, you do not have gestational diabetes. 2. If you do have a lot of sugar in your blood, you are asked to do the second step, the oral glucose tolerance test (OGTT). With the OGTT, you cannot eat or drink for at least 8 hours before the test. Then a blood sample is taken when you arrive for the test. This is your fasting blood glucose value. It provides a baseline for comparing other glucose values. You drink a liquid that contains 100 grams of sugar (glucose). Your blood sample is taken 3 hours later to see how much sugar is in your blood. If you don't have a lot of sugar in your blood, you don't have gestational diabetes. If you do have a lot of sugar in your blood, you may have gestational diabetes. A different method is done in one step. It is another version of the OGTT.  You cannot eat or drink for at least 8 hours before the test. Then a blood sample is taken when you arrive for the test. This is your fasting blood glucose value. It provides a baseline for comparing other glucose values. You drink a liquid that contains 75 grams of sugar (glucose). Your blood sample is taken 1 and then 2 hours later to see how much sugar is in your blood. If you don't have a lot of sugar in your blood, you do not have gestational diabetes. If you do have a lot of sugar in your blood, you may have gestational diabetes. What else should you know about the test? 
Your blood glucose level may drop very low toward the end of the glucose test. If this happens, you may feel weak, hungry, and restless. Tell your doctor if you have these symptoms. The test usually will be stopped. You may vomit after drinking the sweet liquid. If this happens, the test may need to be repeated at a later time. Your doctor may do more glucose tests at different times during your pregnancy. Follow-up care is a key part of your treatment and safety. Be sure to make and go to all appointments, and call your doctor if you are having problems. It's also a good idea to know your test results and keep a list of the medicines you take. Where can you learn more? Go to http://aubrey-pranav.info/. Enter B146 in the search box to learn more about \"Learning About Glucose Testing During Pregnancy. \" Current as of: March 13, 2017 Content Version: 11.4 © 4767-6378 ImmunoPhotonics. Care instructions adapted under license by [employer] (which disclaims liability or warranty for this information). If you have questions about a medical condition or this instruction, always ask your healthcare professional. [place] [address] any warranty or liability for your use of this information. Introducing [facility] & HEALTH SERVICES! Sam Ruvalcaba introduces [employer] patient portal. Now you can access parts of your medical record, email your doctor's office, and request medication refills online.    
 
1. In your internet browser, go to [email or web address] 2. Click on the First Time User? Click Here link in the Sign In box. You will see the New Member Sign Up page. 3. Enter your [employer] Access Code exactly as it appears below. You will not need to use this code after youve completed the sign-up process. If you do not sign up before the expiration date, you must request a new code. · [employer] Access Code: [email or web address] Expires: 6/30/2018  5:35 AM 
 
4. Enter the last four digits of your Social Security Number (xxxx) and Date of Birth (mm/dd/yyyy) as indicated and click Submit. You will be taken to the next sign-up page. 5. Create a Ruby Groupet ID. This will be your [employer] login ID and cannot be changed, so think of one that is secure and easy to remember. 6. Create a [employer] password. You can change your password at any time. 7. Enter your Password Reset Question and Answer. This can be used at a later time if you forget your password. 8. Enter your e-mail address. You will receive e-mail notification when new information is available in [address] 9. Click Sign Up. You can now view and download portions of your medical record. 10. Click the Download Summary menu link to download a portable copy of your medical information. If you have questions, please visit the Frequently Asked Questions section of the [employer] website. Remember, [employer] is NOT to be used for urgent needs. For medical emergencies, dial 911. Now available from your iPhone and Android! Please provide this summary of care documentation to your next provider. Your primary care clinician is listed as Phys Other. If you have any questions after today's visit, please call [phone].

## 2018-04-30 ENCOUNTER — ROUTINE PRENATAL (OUTPATIENT)
Dept: OBGYN CLINIC | Age: 33
End: 2018-04-30

## 2018-04-30 VITALS
BODY MASS INDEX: 32.86 KG/M2 | HEART RATE: 117 BPM | RESPIRATION RATE: 18 BRPM | HEIGHT: 68 IN | WEIGHT: 216.8 LBS | SYSTOLIC BLOOD PRESSURE: 109 MMHG | TEMPERATURE: 97.3 F | DIASTOLIC BLOOD PRESSURE: 63 MMHG

## 2018-04-30 DIAGNOSIS — R80.9 PROTEINURIA, UNSPECIFIED TYPE: Primary | ICD-10-CM

## 2018-04-30 NOTE — PROGRESS NOTES
Chief Complaint   Patient presents with    Routine Prenatal Visit     no pain/ no spotting, no concerns     1. Have you been to the ER, urgent care clinic since your last visit? Hospitalized since your last visit? No    2. Have you seen or consulted any other health care providers outside of the [address] [place] [address] since your last visit? Include any pap smears or colon screening.  No    U/A= Ket- trace, Pro 1+ and Dominique 3+

## 2018-04-30 NOTE — MR AVS SNAPSHOT
[address] [facility] [address] 
[phone] Patient: Bolivar Toro MRN: [id number] NXO:[id number] Visit Information Date & Time Provider Department Dept. Phone Encounter #  
 4/30/2018 10:30 AM Sae Briseno NP [facility] AT [address] [facility] [id number] Follow-up Instructions Return in about 2 weeks (around 5/14/2018). Upcoming Health Maintenance Date Due  
 OB 3RD TRIMESTER TDAP 4/25/2018 Influenza Age 5 to Adult 8/1/2018 PAP AKA CERVICAL CYTOLOGY 1/2/2021 Allergies as of 4/30/2018  Review Complete On: 4/30/2018 By: Sae Briseno NP No Known Allergies Current Immunizations  Never Reviewed No immunizations on file. Not reviewed this visit You Were Diagnosed With   
  
 Codes Comments Proteinuria, unspecified type    -  Primary ICD-10-CM: R80.9 ICD-9-CM: 791.0 Vitals BP Pulse Temp Resp Height(growth percentile) Weight(growth percentile) 109/63 (!) 117 97.3 °F (36.3 °C) (Oral) 18 5' 8\" (1.727 m) 216 lb 12.8 oz (98.3 kg) LMP BMI OB Status Smoking Status 10/18/2017 (Approximate) 32.96 kg/m2 Pregnant Never Smoker BMI and BSA Data Body Mass Index Body Surface Area  
 32.96 kg/m 2 2.17 m 2 Preferred Pharmacy Pharmacy Name Phone [facility] [address]  [place] [phone] Your Updated Medication List  
  
   
This list is accurate as of 4/30/18 10:51 AM.  Always use your most recent med list.  
  
  
  
  
 PNV Combo #19-Iron-Fol Ac-DHA 22-6-1-200 mg Cap Take 1 Tab by mouth daily. We Performed the Following CULTURE, URINE [id number] CPT(R)] Follow-up Instructions Return in about 2 weeks (around 5/14/2018). Patient Instructions Urine Culture: About This Test 
What is it? A urine culture is a test to find germs (such as bacteria) that can cause an infection. A sample of urine is added to a substance that promotes the growth of germs. If no germs grow, the culture is negative. If germs that can cause infection grow, the culture is positive. The type of germ may be identified using a microscope or chemical tests. Why is this test done? A urine culture may be done to: · Find the cause of a urinary tract infection (UTI). · Make decisions about the best treatment for a UTI. · Find out whether treatment for a UTI worked. How can you prepare for the test? 
You don't need to do anything before you have the test. If you are taking or have recently taken antibiotics, tell your doctor. What happens before the test? 
You will need to drink enough fluids and avoid urinating so that you will be able to collect a urine sample. What happens during the test? 
You will be asked to collect a clean-catch midstream urine sample for testing. The first urine of the day is best because bacteria levels will be higher. · Wash your hands before collecting the urine. · If the container has a lid, remove the lid of the container and set it down with the inner surface up. · Clean the area around your penis or vagina. · Begin urinating into the toilet or urinal. 
· After the urine has flowed for several seconds, place the collection container in the stream and collect about 2 ounces (a quarter cup) of this \"midstream\" urine without stopping the flow. · Don't touch the rim of the container to your genital area. · Finish urinating into the toilet or urinal. 
· Carefully replace the lid on the container. · Wash your hands. How long does the test take? · The test will take a few minutes. What happens after the test? 
· You will probably be able to go home right away. The results of a urine culture are usually available in 1 to 3 days. · You can go back to your usual activities right away. Follow-up care is a key part of your treatment and safety. Be sure to make and go to all appointments, and call your doctor if you are having problems. It's also a good idea to keep a list of the medicines you take. Ask your doctor when you can expect to have your test results. Where can you learn more? Go to http://aubrey-pranav.info/. Enter W654 in the search box to learn more about \"Urine Culture: About This Test.\" Current as of: October 14, 2016 Content Version: 11.4 © 9797-2377 iSoftStone. Care instructions adapted under license by BioSET (which disclaims liability or warranty for this information). If you have questions about a medical condition or this instruction, always ask your healthcare professional. [address] any warranty or liability for your use of this information. Introducing [facility] & HEALTH SERVICES! [employer] introduces [employer] patient portal. Now you can access parts of your medical record, email your doctor's office, and request medication refills online. 1. In your internet browser, go to [email or web address] 2. Click on the First Time User? Click Here link in the Sign In box. You will see the New Member Sign Up page. 3. Enter your Digital [employer] Access Code exactly as it appears below. You will not need to use this code after youve completed the sign-up process. If you do not sign up before the expiration date, you must request a new code. · [employer] Access Code: [email or web address] Expires: 6/30/2018  5:35 AM 
 
4. Enter the last four digits of your Social Security Number (xxxx) and Date of Birth (mm/dd/yyyy) as indicated and click Submit. You will be taken to the next sign-up page. 5. Create a Digital Chocolate ID. This will be your Digital [employer] login ID and cannot be changed, so think of one that is secure and easy to remember. 6. Create a Evestrat password. You can change your password at any time. 7. Enter your Password Reset Question and Answer. This can be used at a later time if you forget your password. 8. Enter your e-mail address. You will receive e-mail notification when new information is available in [address] 9. Click Sign Up. You can now view and download portions of your medical record. 10. Click the Download Summary menu link to download a portable copy of your medical information. If you have questions, please visit the Frequently Asked Questions section of the Shhmooze website. Remember, Sh[employer] is NOT to be used for urgent needs. For medical emergencies, dial 911. Now available from your iPhone and Android! Please provide this summary of care documentation to your next provider. Your primary care clinician is listed as Phys Other. If you have any questions after today's visit, please call [phone].

## 2018-04-30 NOTE — PROGRESS NOTES
Return OB Visit    Pt doing well. States good FM, no LOF, no VB. Urine has trace blood and 3+ leukocytes--urine c/s ordered. Having 1 hr. GTT today. Visit Vitals    /63    Pulse (!) 117    Temp 97.3 °F (36.3 °C) (Oral)    Resp 18    Ht 5' 8\" (1.727 m)    Wt 216 lb 12.8 oz (98.3 kg)    LMP 10/18/2017 (Approximate)    BMI 32.96 kg/m2      See exam on prenatal record/reviewed     Plan routine OB care        ICD-10-CM ICD-9-CM    1. Proteinuria, unspecified type R80.9 791.0 CULTURE, URINE      CANCELED: CULTURE, URINE     Plan  Urine c/s sent. 1 hr. GTT pending. Offer T Dap soon. RTO 2 weeks. All questions addressed. Pt. Voices understanding of treatment plan. Follow-up Disposition:  Return in about 2 weeks (around 5/14/2018).       Marta Vinson RN, [facility]

## 2018-04-30 NOTE — PATIENT INSTRUCTIONS
Urine Culture: About This Test  What is it? A urine culture is a test to find germs (such as bacteria) that can cause an infection. A sample of urine is added to a substance that promotes the growth of germs. If no germs grow, the culture is negative. If germs that can cause infection grow, the culture is positive. The type of germ may be identified using a microscope or chemical tests. Why is this test done? A urine culture may be done to:  · Find the cause of a urinary tract infection (UTI). · Make decisions about the best treatment for a UTI. · Find out whether treatment for a UTI worked. How can you prepare for the test?  You don't need to do anything before you have the test. If you are taking or have recently taken antibiotics, tell your doctor. What happens before the test?  You will need to drink enough fluids and avoid urinating so that you will be able to collect a urine sample. What happens during the test?  You will be asked to collect a clean-catch midstream urine sample for testing. The first urine of the day is best because bacteria levels will be higher. · Wash your hands before collecting the urine. · If the container has a lid, remove the lid of the container and set it down with the inner surface up. · Clean the area around your penis or vagina. · Begin urinating into the toilet or urinal.  · After the urine has flowed for several seconds, place the collection container in the stream and collect about 2 ounces (a quarter cup) of this \"midstream\" urine without stopping the flow. · Don't touch the rim of the container to your genital area. · Finish urinating into the toilet or urinal.  · Carefully replace the lid on the container. · Wash your hands. How long does the test take? · The test will take a few minutes. What happens after the test?  · You will probably be able to go home right away. The results of a urine culture are usually available in 1 to 3 days.   · You can go back to your usual activities right away. Follow-up care is a key part of your treatment and safety. Be sure to make and go to all appointments, and call your doctor if you are having problems. It's also a good idea to keep a list of the medicines you take. Ask your doctor when you can expect to have your test results. Where can you learn more? Go to http://aubrey-pranav.info/. Enter E528 in the search box to learn more about \"Urine Culture: About This Test.\"  Current as of: October 14, 2016  Content Version: 11.4  © 7916-7576 Proxly. Care instructions adapted under license by MediaRoost (which disclaims liability or warranty for this information). If you have questions about a medical condition or this instruction, always ask your healthcare professional. [address] any warranty or liability for your use of this information.

## 2018-05-01 ENCOUNTER — TELEPHONE (OUTPATIENT)
Dept: OBGYN CLINIC | Age: 33
End: 2018-05-01

## 2018-05-01 LAB
BASOPHILS # BLD AUTO: 0 X10E3/UL (ref 0–0.2)
BASOPHILS NFR BLD AUTO: 0 %
EOSINOPHIL # BLD AUTO: 0.1 X10E3/UL (ref 0–0.4)
EOSINOPHIL NFR BLD AUTO: 1 %
ERYTHROCYTE [DISTWIDTH] IN BLOOD BY AUTOMATED COUNT: 15.1 % (ref 12.3–15.4)
GTT 60 MIN, EXTERNAL: 117
GTT GEST 2H PNL UR+SERPL: 117 MG/DL (ref 65–139)
HBSAG, EXTERNAL: NEGATIVE
HCT VFR BLD AUTO: 30.5 % (ref 34–46.6)
HCT, EXTERNAL: 30.5
HGB BLD-MCNC: 9.7 G/DL (ref 11.1–15.9)
HGB EVAL, EXTERNAL: NORMAL
HGB, EXTERNAL: 9.7
HIV, EXTERNAL: NORMAL
IMM GRANULOCYTES # BLD: 0.1 X10E3/UL (ref 0–0.1)
IMM GRANULOCYTES NFR BLD: 1 %
LYMPHOCYTES # BLD AUTO: 1.2 X10E3/UL (ref 0.7–3.1)
LYMPHOCYTES NFR BLD AUTO: 14 %
MCH RBC QN AUTO: 25.7 PG (ref 26.6–33)
MCHC RBC AUTO-ENTMCNC: 31.8 G/DL (ref 31.5–35.7)
MCV RBC AUTO: 81 FL (ref 79–97)
MONOCYTES # BLD AUTO: 0.9 X10E3/UL (ref 0.1–0.9)
MONOCYTES NFR BLD AUTO: 10 %
NEUTROPHILS # BLD AUTO: 6.3 X10E3/UL (ref 1.4–7)
NEUTROPHILS NFR BLD AUTO: 74 %
PLATELET # BLD AUTO: 190 X10E3/UL (ref 150–379)
PLATELET CNT,   EXTERNAL: 190
RBC # BLD AUTO: 3.78 X10E6/UL (ref 3.77–5.28)
RUBELLA, EXTERNAL: NORMAL
T. PALLIDUM, EXTERNAL: NEGATIVE
TYPE, ABO & RH, EXTERNAL: NORMAL
WBC # BLD AUTO: 8.5 X10E3/UL (ref 3.4–10.8)

## 2018-05-01 RX ORDER — LANOLIN ALCOHOL/MO/W.PET/CERES
325 CREAM (GRAM) TOPICAL
Qty: 30 TAB | Refills: 3 | Status: SHIPPED | OUTPATIENT
Start: 2018-05-01

## 2018-05-01 NOTE — TELEPHONE ENCOUNTER
----- Message from Jessika Oliver NP sent at 5/1/2018  9:17 AM EDT -----  Please notify pt that her 1 hr. GTT was normal however she is anemic and needs to take an iron supplement.

## 2018-05-01 NOTE — PROGRESS NOTES
Please notify pt that her 1 hr. GTT was normal however she is anemic and needs to take an iron supplement.

## 2018-05-02 LAB — BACTERIA UR CULT: NO GROWTH

## 2018-05-04 ENCOUNTER — TELEPHONE (OUTPATIENT)
Dept: NEUROLOGY | Age: 33
End: 2018-05-04

## 2018-05-14 ENCOUNTER — ROUTINE PRENATAL (OUTPATIENT)
Dept: OBGYN CLINIC | Age: 33
End: 2018-05-14

## 2018-05-14 VITALS
TEMPERATURE: 97.7 F | HEART RATE: 71 BPM | HEIGHT: 68 IN | RESPIRATION RATE: 18 BRPM | DIASTOLIC BLOOD PRESSURE: 70 MMHG | SYSTOLIC BLOOD PRESSURE: 124 MMHG | WEIGHT: 219 LBS | BODY MASS INDEX: 33.19 KG/M2

## 2018-05-14 DIAGNOSIS — O09.33 PRENATAL CARE INSUFFICIENT, THIRD TRIMESTER: Primary | ICD-10-CM

## 2018-05-14 NOTE — PROGRESS NOTES
Return OB Visit    Pt doing well. Does report a slightly stronger odor to her urine however urine dipstick negative. Urine c/s offered however patient desires to force fluids and if any symptoms begin. States good FM, no LOF, no VB. Visit Vitals    /70    Pulse 71    Temp 97.7 °F (36.5 °C) (Oral)    Resp 18    Ht 5' 8\" (1.727 m)    Wt 219 lb (99.3 kg)    LMP 10/18/2017 (Approximate)    BMI 33.3 kg/m2      See exam on prenatal record/reviewed     Plan routine OB care    RTO 2 weeks for TRACI. Ultrasound ordered for growth/ fluid / presentation in 3-4 weeks. Likely check cervix and GBS at 33-34 weeks. All questions addressed. Pt. Voices understanding of treatment plan.     Follow-up Disposition: Not on [address] [facility], [facility], [facility]

## 2018-05-14 NOTE — PROGRESS NOTES
Chief Complaint   Patient presents with    Routine Prenatal Visit     1. Have you been to the ER, urgent care clinic since your last visit? Hospitalized since your last visit? No    2. Have you seen or consulted any other health care providers outside of the [address] [place] [address] since your last visit? Include any pap smears or colon screening.  No

## 2018-05-14 NOTE — PATIENT INSTRUCTIONS
Learning About Pregnancy  Your Care Instructions    Your health in the early weeks of your pregnancy is particularly important for your baby's health. Take good care of yourself. Anything you do that harms your body can also harm your baby. Make sure to go to all of your doctor appointments. Regular checkups will help keep you and your baby healthy. How can you care for yourself at home? Diet  ? · Eat a balanced diet. Make sure your diet includes plenty of beans, peas, and leafy green vegetables. ? · Do not skip meals or go for many hours without eating. If you are nauseated, try to eat a small, healthy snack every 2 to 3 hours. ? · Do not eat fish that has a high level of mercury, such as shark, swordfish, or mackerel. Do not eat more than one can of tuna each week. ? · Drink plenty of fluids, enough so that your urine is light yellow or clear like water. If you have kidney, heart, or liver disease and have to limit fluids, talk with your doctor before you increase the amount of fluids you drink. ? · Cut down on caffeine, such as coffee, tea, and cola. ? · Do not drink alcohol, such as beer, wine, or hard liquor. ? · Take a multivitamin that contains at least 400 micrograms (mcg) of folic acid to help prevent birth defects. Fortified cereal and whole wheat bread are good additional sources of folic acid. ? · Increase the calcium in your diet. Try to drink a quart of skim milk each day. You may also take calcium supplements and choose foods such as cheese and yogurt. ? Lifestyle  ? · Make sure you go to your follow-up appointments. ? · Get plenty of rest. You may be unusually tired while you are pregnant. ? · Get at least 30 minutes of exercise on most days of the week. Walking is a good choice. If you have not exercised in the past, start out slowly. Take several short walks each day. ? · Do not smoke. If you need help quitting, talk to your doctor about stop-smoking programs.  These can increase your chances of quitting for good. ? · Do not touch cat feces or litter boxes. Also, wash your hands after you handle raw meat, and fully cook all meat before you eat it. Wear gloves when you work in the yard or garden, and wash your hands well when you are done. Cat feces, raw or undercooked meat, and contaminated dirt can cause an infection that may harm your baby or lead to a miscarriage. ? · Do not use saunas or hot tubs. Raising your body temperature may harm your baby. ? · Avoid chemical fumes, paint fumes, or poisons. ? · Do not use illegal drugs or alcohol. Medicines  ? · Review all of your medicines with your doctor. Some of your routine medicines may need to be changed to protect your baby. ? · Use acetaminophen (Tylenol) to relieve minor problems, such as a mild headache or backache or a mild fever with cold symptoms. Do not use nonsteroidal anti-inflammatory drugs (NSAIDs), such as ibuprofen (Advil, Motrin) or naproxen (Aleve), unless your doctor says it is okay. ? · Do not take two or more pain medicines at the same time unless the doctor told you to. Many pain medicines have acetaminophen, which is Tylenol. Too much acetaminophen (Tylenol) can be harmful. ? · Take your medicines exactly as prescribed. Call your doctor if you think you are having a problem with your medicine. ?To manage morning sickness  ? · If you feel sick when you first wake up, try eating a small snack (such as crackers) before you get out of bed. Allow some time to digest the snack, and then get out of bed slowly. ? · Do not skip meals or go for long periods without eating. An empty stomach can make nausea worse. ? · Eat small, frequent meals instead of three large meals each day. ? · Drink plenty of fluids. Sports drinks, such as Gatorade or Powerade, are good choices. ? · Eat foods that are high in protein but low in fat.    ? · If you are taking iron supplements, ask your doctor if they are necessary. Iron can make nausea worse. ? · Avoid any smells, such as coffee, that make you feel sick. ? · Get lots of rest. Morning sickness may be worse when you are tired. Follow-up care is a key part of your treatment and safety. Be sure to make and go to all appointments, and call your doctor if you are having problems. It's also a good idea to know your test results and keep a list of the medicines you take. Where can you learn more? Go to http://aubrey-pranav.info/. Enter O266 in the search box to learn more about \"Learning About Pregnancy. \"  Current as of: March 16, 2017  Content Version: 11.4  © 5298-7208 [employer]. Care instructions adapted under license by [employer] (which disclaims liability or warranty for this information). If you have questions about a medical condition or this instruction, always ask your healthcare professional. [address] any warranty or liability for your use of this information.

## 2018-05-31 ENCOUNTER — ROUTINE PRENATAL (OUTPATIENT)
Dept: OBGYN CLINIC | Age: 33
End: 2018-05-31

## 2018-05-31 VITALS
BODY MASS INDEX: 33.31 KG/M2 | HEIGHT: 68 IN | HEART RATE: 83 BPM | DIASTOLIC BLOOD PRESSURE: 76 MMHG | WEIGHT: 219.8 LBS | SYSTOLIC BLOOD PRESSURE: 112 MMHG

## 2018-05-31 DIAGNOSIS — Z34.93 PRENATAL CARE IN THIRD TRIMESTER: Primary | ICD-10-CM

## 2018-05-31 NOTE — PATIENT INSTRUCTIONS
Diphtheria/Acellular Pertussis/Tetanus Vaccine (DTaP) (By injection)   Diphtheria Toxoid, Adsorbed (dif-THEER-ee-a TOX-oyd, ad-SORBD), Pertussis Vaccine, Acellular (per-TUS-iss VAX-een, z-OWLD-xcs-lar), Tetanus Toxoid (TET-a-nus TOX-oyd)  Protects against infections caused by diphtheria, tetanus (lockjaw), and pertussis (whooping cough). Brand Name(s): Adacel, Boostrix, Daptacel, Infanrix   There may be other brand names for this medicine. When This Medicine Should Not Be Used: This vaccine may not be right for everyone. Your child should not receive this vaccine if he or she had an allergic or other serious reaction to tetanus, diphtheria, or pertussis vaccine. Tell the doctor if you child has seizures or other nervous system problems. How to Use This Medicine:   Injectable  · A nurse or other health professional will give this vaccine. The vaccine is given as a shot into a muscle. Most children will receive a series of 5 shots. · Other vaccines may be given at the same time as this one. You should receive other information sheets on those vaccines. Make sure you understand all the information given to you. · Your child may also receive medicines to help prevent or treat some minor side effects of the vaccine. · Missed dose: If this vaccine is part of a series of vaccines, it is important that your child receive all of the shots. Try to keep all scheduled appointments. If your child must miss a shot, make another appointment as soon as possible. Drugs and Foods to Avoid:   Ask your doctor or pharmacist before using any other medicine, including over-the-counter medicines, vitamins, and herbal products. · Some foods and medicines can affect how DTaP vaccine works.  Tell the doctor if your child has recently received any of the following:  ¨ Immune globulin  ¨ Blood thinner (such as warfarin)  ¨ Any treatment that weakens the immune system, such as cancer medicine, radiation treatment, or a steroid  Warnings While Using This Medicine:   · Tell the doctor if your child has a bleeding disorder or a history of Guillain-Barré syndrome. Also tell the doctor if your child has had a severe reaction to a vaccine, including fever or prolonged crying. Make sure the doctor knows if your child was premature. · This vaccine may cause the following problems:  ¨ Guillain-Barré syndrome  · Tell the doctor if your child is allergic to latex rubber or if your child has been sick or had a fever recently. Possible Side Effects While Using This Medicine:   Call your doctor right away if you notice any of these side effects:  · Allergic reaction: Itching or hives, swelling in your face or hands, swelling or tingling in your mouth or throat, chest tightness, trouble breathing  · Crying constantly for 3 hours or more  · Fever over 105 degrees F  · Lightheadedness or fainting  · Seizures  · Severe muscle weakness, sleepiness, or drowsiness  If you notice these less serious side effects, talk with your doctor:   · Fussiness or irritability  · Loss of appetite  · Mild pain, redness, swelling, tenderness, or a lump where the shot was given  If you notice other side effects that you think are caused by this medicine, tell your doctor. Call your doctor for medical advice about side effects. You may report side effects to FDA at 6-122-FDA-0797  © 2017 [address] Information is for End User's use only and may not be sold, redistributed or otherwise used for commercial purposes. The above information is an  only. It is not intended as medical advice for individual conditions or treatments. Talk to your doctor, nurse or pharmacist before following any medical regimen to see if it is safe and effective for you.

## 2018-05-31 NOTE — PROGRESS NOTES
Return OB Visit    Pt doing well. States good FM, no LOF, no VB. Visit Vitals    /76    Pulse 83    Ht 5' 8\" (1.727 m)    Wt 219 lb 12.8 oz (99.7 kg)    LMP 10/18/2017 (Approximate)    BMI 33.42 kg/m2      See exam on prenatal record/reviewed     Plan routine OB care  Ultrasound in 1-2 weeks. Discussed T dap--will have at next visit. RTO 2 weeks--GBS at 35 weeks. All questions addressed. Pt. Voices understanding of treatment plan. Follow-up Disposition:  Return in about 3 weeks (around 6/21/2018) for TRACI with GBS.       Pastora Peters RN, [facility]

## 2018-05-31 NOTE — MR AVS SNAPSHOT
Maurilio Ferrara 
 
 
 [place] [address] [facility] 
[phone] Patient: Zeynep Graham MRN: [id number] HGF:68/28/1948 Visit Information Date & Time Provider Department Dept. Phone Encounter #  
 5/31/2018  1:00 PM [email or web address] AT [address] [id number] Follow-up Instructions Return in about 3 weeks (around 6/21/2018) for TRACI with GBS. 6/11/2018  1:00 PM  
OB VISIT with Judith Reynolds NP  
[facility] AT [facility] ([address]) Appt Note: ob visit [place] [address] [place] [address] [address]  
[phone]  
  
   
 [facility] [address] Upcoming Health Maintenance Date Due  
 OB 3RD TRIMESTER TDAP 4/25/2018 Influenza Age 5 to Adult 8/1/2018 PAP AKA CERVICAL CYTOLOGY 1/2/2021 Allergies as of 5/31/2018  Review Complete On: 5/31/2018 By: Judith Reynolds NP No Known Allergies Current Immunizations  Never Reviewed No immunizations on file. Not reviewed this visit Vitals BP Pulse Height(growth percentile) Weight(growth percentile) LMP BMI  
 112/76 83 5' 8\" (1.727 m) 219 lb 12.8 oz (99.7 kg) 10/18/2017 (Approximate) 33.42 kg/m2 OB Status Smoking Status Pregnant Never Smoker Vitals History BMI and BSA Data Body Mass Index Body Surface Area  
 33.42 kg/m 2 2.19 m 2 Preferred Pharmacy Pharmacy Name Phone [facility] [employer] [address]  [place] [phone] Your Updated Medication List  
  
   
This list is accurate as of 5/31/18  1:45 PM.  Always use your most recent med list.  
  
  
  
  
 ferrous sulfate 325 mg (65 mg iron) tablet Take 1 Tab by mouth Daily (before breakfast). PNV Combo #19-Iron-Fol Ac-DHA 22-6-1-200 mg Cap Take 1 Tab by mouth daily. Follow-up Instructions Return in about 3 weeks (around 6/21/2018) for TRACI with GBS. To-Do List   
 06/06/2018 11:00 AM  
  Appointment with ULTRASOUND 1 [facility] at [id number] [place] [address] [phone] ([phone]) Patient Instructions Diphtheria/Acellular Pertussis/Tetanus Vaccine (DTaP) (By injection) Diphtheria Toxoid, Adsorbed (dif-THEER-ee-a TOX-oyd, ad-SORBD), Pertussis Vaccine, Acellular (per-TUS-iss VAX-een, n-MJLE-xbw-lar), Tetanus Toxoid (TET-a-nus TOX-oyd) Protects against infections caused by diphtheria, tetanus (lockjaw), and pertussis (whooping cough). Brand Name(s): Adacel, Boostrix, Daptacel, Infanrix There may be other brand names for this medicine. When This Medicine Should Not Be Used: This vaccine may not be right for everyone. Your child should not receive this vaccine if he or she had an allergic or other serious reaction to tetanus, diphtheria, or pertussis vaccine. Tell the doctor if you child has seizures or other nervous system problems. How to Use This Medicine:  
Injectable · A nurse or other health professional will give this vaccine. The vaccine is given as a shot into a muscle. Most children will receive a series of 5 shots. · Other vaccines may be given at the same time as this one. You should receive other information sheets on those vaccines. Make sure you understand all the information given to you. · Your child may also receive medicines to help prevent or treat some minor side effects of the vaccine. · Missed dose: If this vaccine is part of a series of vaccines, it is important that your child receive all of the shots. Try to keep all scheduled appointments. If your child must miss a shot, make another appointment as soon as possible. Drugs and Foods to Avoid: Ask your doctor or pharmacist before using any other medicine, including over-the-counter medicines, vitamins, and herbal products. · Some foods and medicines can affect how DTaP vaccine works. Tell the doctor if your child has recently received any of the following: ¨ Immune globulin ¨ Blood thinner (such as warfarin) ¨ Any treatment that weakens the immune system, such as cancer medicine, radiation treatment, or a steroid Warnings While Using This Medicine: · Tell the doctor if your child has a bleeding disorder or a history of Guillain-Barré syndrome. Also tell the doctor if your child has had a severe reaction to a vaccine, including fever or prolonged crying. Make sure the doctor knows if your child was premature. · This vaccine may cause the following problems: ¨ Guillain-Barré syndrome · Tell the doctor if your child is allergic to latex rubber or if your child has been sick or had a fever recently. Possible Side Effects While Using This Medicine:  
Call your doctor right away if you notice any of these side effects: · Allergic reaction: Itching or hives, swelling in your face or hands, swelling or tingling in your mouth or throat, chest tightness, trouble breathing · Crying constantly for 3 hours or more · Fever over 105 degrees F 
· Lightheadedness or fainting · Seizures · Severe muscle weakness, sleepiness, or drowsiness If you notice these less serious side effects, talk with your doctor: · Fussiness or irritability · Loss of appetite · Mild pain, redness, swelling, tenderness, or a lump where the shot was given If you notice other side effects that you think are caused by this medicine, tell your doctor. Call your doctor for medical advice about side effects. You may report side effects to FDA at 2-138-MAD-4596 © 2017 [facility] Information is for End User's use only and may not be sold, redistributed or otherwise used for commercial purposes. The above information is an  only. It is not intended as medical advice for individual conditions or treatments.  Talk to your doctor, nurse or pharmacist before following any medical regimen to see if it is safe and effective for you. Introducing [facility]! [employer] introduces [facility] patient portal. Now you can access parts of your medical record, email your doctor's office, and request medication refills online. 1. In your internet browser, go to [email or web address] 2. Click on the First Time User? Click Here link in the Sign In box. You will see the New Member Sign Up page. 3. Enter your [facility] Access Code exactly as it appears below. You will not need to use this code after youve completed the sign-up process. If you do not sign up before the expiration date, you must request a new code. · [facility] Access Code: [email or web address] Expires: 6/30/2018  5:35 AM 
 
4. Enter the last four digits of your Social Security Number (xxxx) and Date of Birth (mm/dd/yyyy) as indicated and click Submit. You will be taken to the next sign-up page. 5. Create a [facility] ID. This will be your [facility] login ID and cannot be changed, so think of one that is secure and easy to remember. 6. Create a [facility] password. You can change your password at any time. 7. Enter your Password Reset Question and Answer. This can be used at a later time if you forget your password. 8. Enter your e-mail address. You will receive e-mail notification when new information is available in [address] 9. Click Sign Up. You can now view and download portions of your medical record. 10. Click the Download Summary menu link to download a portable copy of your medical information. If you have questions, please visit the Frequently Asked Questions section of the [facility] website. Remember, [facility] is NOT to be used for urgent needs. For medical emergencies, dial 911. Now available from your iPhone and Android! Please provide this summary of care documentation to your next provider. Your primary care clinician is listed as Phys Other. If you have any questions after today's visit, please call [phone].

## 2018-06-06 ENCOUNTER — HOSPITAL ENCOUNTER (OUTPATIENT)
Dept: PERINATAL CARE | Age: 33
Discharge: HOME | End: 2018-06-06
Attending: OBSTETRICS & GYNECOLOGY
Payer: COMMERCIAL

## 2018-06-06 PROCEDURE — 76816 OB US FOLLOW-UP PER FETUS: CPT | Performed by: OBSTETRICS & GYNECOLOGY

## 2018-06-10 LAB
CHLAMYDIA, EXTERNAL: NEGATIVE
N. GONORRHEA, EXTERNAL: NEGATIVE

## 2018-06-11 ENCOUNTER — ROUTINE PRENATAL (OUTPATIENT)
Dept: OBGYN CLINIC | Age: 33
End: 2018-06-11

## 2018-06-11 VITALS
HEART RATE: 101 BPM | HEIGHT: 68 IN | SYSTOLIC BLOOD PRESSURE: 117 MMHG | BODY MASS INDEX: 33.52 KG/M2 | WEIGHT: 221.2 LBS | DIASTOLIC BLOOD PRESSURE: 73 MMHG

## 2018-06-11 DIAGNOSIS — O12.13 PROTEINURIA AFFECTING PREGNANCY IN THIRD TRIMESTER: Primary | ICD-10-CM

## 2018-06-11 NOTE — PATIENT INSTRUCTIONS
Group B Strep During Pregnancy: Care Instructions  Your Care Instructions    Group B strep infection is caused by a type of bacteria. It's a different kind of bacteria than the kind that causes strep throat. You may have this kind of bacteria in your body. Sometimes it may cause an infection, but most of the time it doesn't make you sick or cause symptoms. But if you pass the bacteria to your baby during the birth, it can cause serious health problems for your baby. If you have this bacteria in your body, you will get antibiotics when you are in labor. Antibiotics help prevent problems for a  baby. After birth, doctors will watch and may test your baby. If your baby tests positive for Group B strep, he or she will get antibiotics. If you plan to breastfeed your baby, don't worry. It will be safe to breastfeed. Follow-up care is a key part of your treatment and safety. Be sure to make and go to all appointments, and call your doctor if you are having problems. It's also a good idea to know your test results and keep a list of the medicines you take. How can you care for yourself at home? · If your doctor has prescribed antibiotics, take them as directed. Do not stop taking them just because you feel better. You need to take the full course of antibiotics. · Tell your doctor if you are allergic to any antibiotic. · If your water breaks, go to the hospital right away. Your doctor will give you antibiotics to help protect your baby from infection. · Tell the doctors and nurses at the hospital that you tested positive for group B strep. When should you call for help? Call your doctor now or seek immediate medical care if:  ? · You have symptoms of a urinary tract infection. These may include:  ¨ Pain or burning when you urinate. ¨ A frequent need to urinate without being able to pass much urine.   ¨ Pain in the flank, which is just below the rib cage and above the waist on either side of the back.  ¨ Blood in your urine. ¨ A fever. ? · You think your water has broken. ? · You have pain in your belly or pelvis. ? Watch closely for changes in your health, and be sure to contact your doctor if you have any problems. Where can you learn more? Go to http://aubrey-pranav.info/. Enter M001 in the search box to learn more about \"Group B Strep During Pregnancy: Care Instructions. \"  Current as of: March 16, 2017  Content Version: 11.4  © 9476-6807 [employer]. Care instructions adapted under license by [employer] (which disclaims liability or warranty for this information). If you have questions about a medical condition or this instruction, always ask your healthcare professional. [address] any warranty or liability for your use of this information.

## 2018-06-11 NOTE — PROGRESS NOTES
Return OB Visit    Pt doing well. Does report \"spenser horses at nights\" likely due to leg fatigue. States good FM, no LOF, no VB. Visit Vitals    /73    Pulse (!) 101    Ht 5' 8\" (1.727 m)    Wt 221 lb 3.2 oz (100.3 kg)    LMP 10/18/2017 (Approximate)    BMI 33.63 kg/m2      See exam on prenatal record/reviewed     Plan routine OB care        ICD-10-CM ICD-9-CM    1. Proteinuria affecting pregnancy in third trimester O12.13 646.23 CULTURE, URINE     791.0       Plan;  Urine c/s. RTO 10 days with GBS testing. Recent ultrasound shows good growth, RADHA WNL. RTO 7-10 days. All questions addressed. Pt. Voices understanding of treatment plan. Follow-up Disposition:  Return in about 10 days (around 6/21/2018) for GBS.       Darrell Mims RN, [facility]

## 2018-06-11 NOTE — PROGRESS NOTES
Pt is here for a routine prenatal visit. Her legs hurt a lot. Sometimes her ankles get swollen. Leukocytes 3+ in urine.

## 2018-06-13 LAB — BACTERIA UR CULT: NO GROWTH

## 2018-06-21 ENCOUNTER — ROUTINE PRENATAL (OUTPATIENT)
Dept: OBGYN CLINIC | Age: 33
End: 2018-06-21

## 2018-06-21 VITALS
HEART RATE: 100 BPM | DIASTOLIC BLOOD PRESSURE: 82 MMHG | WEIGHT: 222 LBS | RESPIRATION RATE: 18 BRPM | SYSTOLIC BLOOD PRESSURE: 131 MMHG | BODY MASS INDEX: 33.65 KG/M2 | TEMPERATURE: 96.9 F | HEIGHT: 68 IN

## 2018-06-21 DIAGNOSIS — Z3A.36 36 WEEKS GESTATION OF PREGNANCY: Primary | ICD-10-CM

## 2018-06-21 NOTE — PROGRESS NOTES
Return OB Visit    Pt doing well. Does report increased vaginal odor however denies LOF. States good FM, no LOF, no VB. Visit Vitals    /82    Pulse 100    Temp 96.9 °F (36.1 °C) (Oral)    Resp 18    Ht 5' 8\" (1.727 m)    Wt 222 lb (100.7 kg)    LMP 10/18/2017 (Approximate)    BMI 33.75 kg/m2      See exam on prenatal record/reviewed     Plan routine OB care  GBS taken. NuSwab taken. Recent urine c/s negative--still 2 + proteinuria- BP WNL--encouraged force fluids. RTO 1 weeks with review of labor precautions. ICD-10-CM ICD-9-CM    1. 36 weeks gestation of pregnancy Z3A.36 V22.2 NUSWAB VAGINITIS      GROUP B STREP, YUVAL + REFLEX        All questions addressed. Pt. Voices understanding of treatment plan.     Follow-up Disposition: Not on [address] [facility] [place], [place], [facility]

## 2018-06-21 NOTE — MR AVS SNAPSHOT
[address] [facility] 
 
 
 [place] [address] [place] [address] 
[phone] Patient: Aquilino Mckeon MRN: [id number] RCP:[id number] Visit Information Date & Time Provider Department Dept. Phone Encounter #  
 6/21/2018  3:00 PM Isabela Ayon NP BON [facility] OBGYN AT [address] [facility] [id number]  
  
 6/28/2018  1:30 PM  
OB VISIT with URI Arellano 43 OBGYN AT [facility] ([facility]) Appt Note: ob visit [place] [address] [place] [address]  
[phone]  
  
   
 [address]  
  
    
 7/3/2018  1:30 PM  
OB VISIT with Isabela Ayon NP  
[address] [facility] ([facility]) Appt Note: ob visit [place] [address] [place] [address]  
[phone] 7/12/2018  1:30 PM  
OB VISIT with URI Arellano 43 OBGYN AT [facility] ([facility]) Appt Note: ob visit [place] [address] [place] [address]  
[phone] 7/19/2018  1:30 PM  
OB VISIT with URI Arellano 43 OBGYN AT [facility] ([facility]) Appt Note: ob visit [place] [address] [place] [address]  
[phone] Upcoming Health Maintenance Date Due  
 OB 3RD TRIMESTER TDAP 4/25/2018 Influenza Age 5 to Adult 8/1/2018 PAP AKA CERVICAL CYTOLOGY 1/2/2021 Allergies as of 6/21/2018  Review Complete On: 6/21/2018 By: Isabela Ayon NP No Known Allergies Current Immunizations  Never Reviewed No immunizations on file. Not reviewed this visit Vitals BP Pulse Temp Resp Height(growth percentile) Weight(growth percentile) 131/82 100 96.9 °F (36.1 °C) (Oral) 18 5' 8\" (1.727 m) 222 lb (100.7 kg) LMP BMI OB Status Smoking Status 10/18/2017 (Approximate) 33.75 kg/m2 Pregnant Never Smoker BMI and BSA Data Body Mass Index Body Surface Area 33.75 kg/m 2 2.2 m 2 Preferred Pharmacy Pharmacy Name Phone [facility]  [place] [phone] Your Updated Medication List  
  
   
This list is accurate as of 6/21/18  3:26 PM.  Always use your most recent med list.  
  
  
  
  
 ferrous sulfate 325 mg (65 mg iron) tablet Take 1 Tab by mouth Daily (before breakfast). PNV Combo #19-Iron-Fol Ac-DHA 22-6-1-200 mg Cap Take 1 Tab by mouth daily. Patient Instructions Pain Relief During Labor: Care Instructions Your Care Instructions You can choose from a few types of pain relief for childbirth. These include: · Medicine. Your doctor may offer different types of pain medicine while you are in labor. · Breathing techniques. · Comfort measures. This is often called natural childbirth. You also can use a combination of these choices. You can write down your choices for pain relief in a birth plan. A birth plan is a list of what you want during labor. Your personal needs are important when you make this choice. The right choice is the one that feels right to you. Every labor is different. Some women go into labor planning to have natural childbirth and later find that they need pain relief. Plan for what you want. But be aware that you may change your mind during labor. Follow-up care is a key part of your treatment and safety. Be sure to make and go to all appointments, and call your doctor if you are having problems. It's also a good idea to know your test results and keep a list of the medicines you take. What medicines can you use for pain relief? · Local anesthesia: You get a shot of medicine to numb the area if your doctor needs to make a cut to widen the vaginal opening.  
· Regional anesthesia: A doctor can inject medicine into a space around the spinal cord. This is called an epidural. It can be used during labor to numb your lower body. But lack of feeling sometimes makes it harder to push. Your doctor can give other shots of pain medicine into the pelvic area to numb the area. · Opioids: These are pain relievers given through a vein. They include nalbuphine (Nubain), butorphanol (Stadol), and fentanyl. They can ease anxiety and pain. But they don't stop pain completely. Usually they aren't used right before delivery because they can slow the baby's breathing. What comfort measures can you use for pain relief? · Breathing techniques: Breathing in a rhythm can distract you from pain. Childbirth classes can teach you how to do focused breathing. · Distraction: You can walk, play cards, listen to music, watch TV, take a shower, or read. These can help take your mind off your contractions. · Massage: Your birth partner can massage your shoulders and lower back during contractions. Strong massage of the back muscles during contractions may reduce back labor pain. · Imagery: You can imagine a peaceful place. For instance, you can think of contractions as waves rolling over you. When should you call for help? Watch closely for changes in your health, and be sure to contact your doctor if: 
? · You want to learn more about pain relief. Where can you learn more? Go to http://aubrey-pranav.info/. Enter Rupa Arias in the search box to learn more about \"Pain Relief During Labor: Care Instructions. \" Current as of: March 16, 2017 Content Version: 11.4 © 0585-0913 BrandBe[employer]. Care instructions adapted under license by [employer] (which disclaims liability or warranty for this information). If you have questions about a medical condition or this instruction, always ask your healthcare professional. [address] any warranty or liability for your use of this information. Introducing [facility]! Dajuan Liu introduces Prosodic patient portal. Now you can access parts of your medical record, email your doctor's office, and request medication refills online. 1. In your internet browser, go to [email or web address] 2. Click on the First Time User? Click Here link in the Sign In box. You will see the New Member Sign Up page. 3. Enter your Prosodic Access Code exactly as it appears below. You will not need to use this code after youve completed the sign-up process. If you do not sign up before the expiration date, you must request a new code. · Prosodic Access Code: [email or web address] Expires: 6/30/2018  5:35 AM 
 
4. Enter the last four digits of your Social Security Number (xxxx) and Date of Birth (mm/dd/yyyy) as indicated and click Submit. You will be taken to the next sign-up page. 5. Create a Prosodic ID. This will be your Prosodic login ID and cannot be changed, so think of one that is secure and easy to remember. 6. Create a Prosodic password. You can change your password at any time. 7. Enter your Password Reset Question and Answer. This can be used at a later time if you forget your password. 8. Enter your e-mail address. You will receive e-mail notification when new information is available in [address] 9. Click Sign Up. You can now view and download portions of your medical record. 10. Click the Download Summary menu link to download a portable copy of your medical information. If you have questions, please visit the Frequently Asked Questions section of the Prosodic website. Remember, Prosodic is NOT to be used for urgent needs. For medical emergencies, dial 911. Now available from your iPhone and Android! Please provide this summary of care documentation to your next provider. Your primary care clinician is listed as Phys Other. If you have any questions after today's visit, please call [phone].

## 2018-06-21 NOTE — PATIENT INSTRUCTIONS
Pain Relief During Labor: Care Instructions  Your Care Instructions    You can choose from a few types of pain relief for childbirth. These include:  · Medicine. Your doctor may offer different types of pain medicine while you are in labor. · Breathing techniques. · Comfort measures. This is often called natural childbirth. You also can use a combination of these choices. You can write down your choices for pain relief in a birth plan. A birth plan is a list of what you want during labor. Your personal needs are important when you make this choice. The right choice is the one that feels right to you. Every labor is different. Some women go into labor planning to have natural childbirth and later find that they need pain relief. Plan for what you want. But be aware that you may change your mind during labor. Follow-up care is a key part of your treatment and safety. Be sure to make and go to all appointments, and call your doctor if you are having problems. It's also a good idea to know your test results and keep a list of the medicines you take. What medicines can you use for pain relief? · Local anesthesia: You get a shot of medicine to numb the area if your doctor needs to make a cut to widen the vaginal opening. · Regional anesthesia: A doctor can inject medicine into a space around the spinal cord. This is called an epidural. It can be used during labor to numb your lower body. But lack of feeling sometimes makes it harder to push. Your doctor can give other shots of pain medicine into the pelvic area to numb the area. · Opioids: These are pain relievers given through a vein. They include nalbuphine (Nubain), butorphanol (Stadol), and fentanyl. They can ease anxiety and pain. But they don't stop pain completely. Usually they aren't used right before delivery because they can slow the baby's breathing. What comfort measures can you use for pain relief?   · Breathing techniques: Breathing in a rhythm can distract you from pain. Childbirth classes can teach you how to do focused breathing. · Distraction: You can walk, play cards, listen to music, watch TV, take a shower, or read. These can help take your mind off your contractions. · Massage: Your birth partner can massage your shoulders and lower back during contractions. Strong massage of the back muscles during contractions may reduce back labor pain. · Imagery: You can imagine a peaceful place. For instance, you can think of contractions as waves rolling over you. When should you call for help? Watch closely for changes in your health, and be sure to contact your doctor if:  ? · You want to learn more about pain relief. Where can you learn more? Go to http://aubrey-pranav.info/. Enter WaveRx in the search box to learn more about \"Pain Relief During Labor: Care Instructions. \"  Current as of: March 16, 2017  Content Version: 11.4  © 9779-9496 [employer]rugg[employer]. Care instructions adapted under license by [employer] (which disclaims liability or warranty for this information). If you have questions about a medical condition or this instruction, always ask your healthcare professional. [place] [address] any warranty or liability for your use of this information.

## 2018-06-26 LAB
A VAGINAE DNA VAG QL NAA+PROBE: NORMAL SCORE
BVAB2 DNA VAG QL NAA+PROBE: NORMAL SCORE
C ALBICANS DNA VAG QL NAA+PROBE: NEGATIVE
C GLABRATA DNA VAG QL NAA+PROBE: NEGATIVE
GP B STREP DNA SPEC QL NAA+PROBE: POSITIVE
GRBS, EXTERNAL: POSITIVE
HBSAG, EXTERNAL: NEGATIVE
HBSAG, EXTERNAL: NEGATIVE
HCT, EXTERNAL: 30.5
HGB, EXTERNAL: 9.7
MEGA1 DNA VAG QL NAA+PROBE: NORMAL SCORE
ORGANISM IDENTIFICATION, 188761: NORMAL
PLATELET CNT,   EXTERNAL: 190
T VAGINALIS RRNA SPEC QL NAA+PROBE: NEGATIVE
T. PALLIDUM, EXTERNAL: NEGATIVE
T. PALLIDUM, EXTERNAL: NEGATIVE

## 2018-06-28 ENCOUNTER — ROUTINE PRENATAL (OUTPATIENT)
Dept: OBGYN CLINIC | Age: 33
End: 2018-06-28

## 2018-06-28 VITALS — SYSTOLIC BLOOD PRESSURE: 134 MMHG | BODY MASS INDEX: 34.06 KG/M2 | DIASTOLIC BLOOD PRESSURE: 78 MMHG | WEIGHT: 224 LBS

## 2018-06-28 DIAGNOSIS — Z34.93 PRENATAL CARE IN THIRD TRIMESTER: Primary | ICD-10-CM

## 2018-06-28 NOTE — PATIENT INSTRUCTIONS
Pain Relief During Labor: Care Instructions  Your Care Instructions    You can choose from a few types of pain relief for childbirth. These include:  · Medicine. Your doctor may offer different types of pain medicine while you are in labor. · Breathing techniques. · Comfort measures. This is often called natural childbirth. You also can use a combination of these choices. You can write down your choices for pain relief in a birth plan. A birth plan is a list of what you want during labor. Your personal needs are important when you make this choice. The right choice is the one that feels right to you. Every labor is different. Some women go into labor planning to have natural childbirth and later find that they need pain relief. Plan for what you want. But be aware that you may change your mind during labor. Follow-up care is a key part of your treatment and safety. Be sure to make and go to all appointments, and call your doctor if you are having problems. It's also a good idea to know your test results and keep a list of the medicines you take. What medicines can you use for pain relief? · Local anesthesia: You get a shot of medicine to numb the area if your doctor needs to make a cut to widen the vaginal opening. · Regional anesthesia: A doctor can inject medicine into a space around the spinal cord. This is called an epidural. It can be used during labor to numb your lower body. But lack of feeling sometimes makes it harder to push. Your doctor can give other shots of pain medicine into the pelvic area to numb the area. · Opioids: These are pain relievers given through a vein. They include nalbuphine (Nubain), butorphanol (Stadol), and fentanyl. They can ease anxiety and pain. But they don't stop pain completely. Usually they aren't used right before delivery because they can slow the baby's breathing. What comfort measures can you use for pain relief?   · Breathing techniques: Breathing in a rhythm can distract you from pain. Childbirth classes can teach you how to do focused breathing. · Distraction: You can walk, play cards, listen to music, watch TV, take a shower, or read. These can help take your mind off your contractions. · Massage: Your birth partner can massage your shoulders and lower back during contractions. Strong massage of the back muscles during contractions may reduce back labor pain. · Imagery: You can imagine a peaceful place. For instance, you can think of contractions as waves rolling over you. When should you call for help? Watch closely for changes in your health, and be sure to contact your doctor if:  ? · You want to learn more about pain relief. Where can you learn more? Go to http://aubrey-pranav.info/. Enter Johnshen Christine in the search box to learn more about \"Pain Relief During Labor: Care Instructions. \"  Current as of: March 16, 2017  Content Version: 11.4  © 3722-6770 "[employer]". Care instructions adapted under license by [employer]cer (which disclaims liability or warranty for this information). If you have questions about a medical condition or this instruction, always ask your healthcare professional. [address] any warranty or liability for your use of this information.

## 2018-06-28 NOTE — MR AVS SNAPSHOT
[address] [facility] [address] [address] [place] 
[phone] Patient: Bony Mckeon MRN: [id number] KBY:[id number] Visit Information Date & Time Provider Department Dept. Phone Encounter #  
 6/28/2018  1:30 PM URI Riggs [address] [facility] OBGYN AT [address] [id number] Follow-up Instructions Return in about 1 week (around 7/5/2018) for TRACI.  
  
 7/3/2018  1:30 PM  
OB VISIT with Justino Neil NP  
[address] [facility] ([address]) Appt Note: ob visit [place] [address] [place]  
[phone]  
  
   
 [address] [place] [address]  
  
    
 7/12/2018  1:30 PM  
OB VISIT with Justino Neil NP  
[address] [facility] ([address]) Appt Note: ob visit [place] [address] [place]  
[phone] 7/19/2018  1:30 PM  
OB VISIT with URI Riggs 43 OBGYN AT [facility] ([address]) Appt Note: ob visit [place] [address] [place]  
[phone] Upcoming Health Maintenance Date Due  
 OB 3RD TRIMESTER TDAP 4/25/2018 Influenza Age 5 to Adult 8/1/2018 PAP AKA CERVICAL CYTOLOGY 1/2/2021 Allergies as of 6/28/2018  Review Complete On: 6/28/2018 By: Justino Neil NP No Known Allergies Current Immunizations  Never Reviewed No immunizations on file. Not reviewed this visit Vitals BP Weight(growth percentile) LMP BMI OB Status Smoking Status 134/78 (BP 1 Location: Left arm, BP Patient Position: Sitting) 224 lb (101.6 kg) 10/18/2017 (Approximate) 34.06 kg/m2 Pregnant Never Smoker BMI and BSA Data Body Mass Index Body Surface Area 34.06 kg/m 2 2.21 m 2 Preferred Pharmacy Pharmacy Name Phone  [address] [facility] AT [address] [phone] Your Updated Medication List  
  
   
This list is accurate as of 6/28/18  2:00 PM.  Always use your most recent med list.  
  
  
  
  
 ferrous sulfate 325 mg (65 mg iron) tablet Take 1 Tab by mouth Daily (before breakfast). PNV Combo #19-Iron-Fol Ac-DHA 22-6-1-200 mg Cap Take 1 Tab by mouth daily. Follow-up Instructions Return in about 1 week (around 7/5/2018) for TRACI. Patient Instructions Pain Relief During Labor: Care Instructions Your Care Instructions You can choose from a few types of pain relief for childbirth. These include: · Medicine. Your doctor may offer different types of pain medicine while you are in labor. · Breathing techniques. · Comfort measures. This is often called natural childbirth. You also can use a combination of these choices. You can write down your choices for pain relief in a birth plan. A birth plan is a list of what you want during labor. Your personal needs are important when you make this choice. The right choice is the one that feels right to you. Every labor is different. Some women go into labor planning to have natural childbirth and later find that they need pain relief. Plan for what you want. But be aware that you may change your mind during labor. Follow-up care is a key part of your treatment and safety. Be sure to make and go to all appointments, and call your doctor if you are having problems. It's also a good idea to know your test results and keep a list of the medicines you take. What medicines can you use for pain relief? · Local anesthesia: You get a shot of medicine to numb the area if your doctor needs to make a cut to widen the vaginal opening. · Regional anesthesia: A doctor can inject medicine into a space around the spinal cord.  This is called an epidural. It can be used during labor to numb your lower body. But lack of feeling sometimes makes it harder to push. Your doctor can give other shots of pain medicine into the pelvic area to numb the area. · Opioids: These are pain relievers given through a vein. They include nalbuphine (Nubain), butorphanol (Stadol), and fentanyl. They can ease anxiety and pain. But they don't stop pain completely. Usually they aren't used right before delivery because they can slow the baby's breathing. What comfort measures can you use for pain relief? · Breathing techniques: Breathing in a rhythm can distract you from pain. Childbirth classes can teach you how to do focused breathing. · Distraction: You can walk, play cards, listen to music, watch TV, take a shower, or read. These can help take your mind off your contractions. · Massage: Your birth partner can massage your shoulders and lower back during contractions. Strong massage of the back muscles during contractions may reduce back labor pain. · Imagery: You can imagine a peaceful place. For instance, you can think of contractions as waves rolling over you. When should you call for help? Watch closely for changes in your health, and be sure to contact your doctor if: 
? · You want to learn more about pain relief. Where can you learn more? Go to http://aubrey-pranav.info/. Enter Karen Cui in the search box to learn more about \"Pain Relief During Labor: Care Instructions. \" Current as of: March 16, 2017 Content Version: 11.4 © 5232-7296 gis.to. Care instructions adapted under license by [employer] (which disclaims liability or warranty for this information). If you have questions about a medical condition or this instruction, always ask your healthcare professional. [place] [address] any warranty or liability for your use of this information. Introducing [facility] SERVICES! [employer] introduces Legal River patient portal. Now you can access parts of your medical record, email your doctor's office, and request medication refills online. 1. In your internet browser, go to [email or web address] 2. Click on the First Time User? Click Here link in the Sign In box. You will see the New Member Sign Up page. 3. Enter your Legal River Access Code exactly as it appears below. You will not need to use this code after youve completed the sign-up process. If you do not sign up before the expiration date, you must request a new code. · Legal River Access Code: [email or web address] Expires: 6/30/2018  5:35 AM 
 
4. Enter the last four digits of your Social Security Number (xxxx) and Date of Birth (mm/dd/yyyy) as indicated and click Submit. You will be taken to the next sign-up page. 5. Create a Legal River ID. This will be your Legal River login ID and cannot be changed, so think of one that is secure and easy to remember. 6. Create a Legal River password. You can change your password at any time. 7. Enter your Password Reset Question and Answer. This can be used at a later time if you forget your password. 8. Enter your e-mail address. You will receive e-mail notification when new information is available in [address] 9. Click Sign Up. You can now view and download portions of your medical record. 10. Click the Download Summary menu link to download a portable copy of your medical information. If you have questions, please visit the Frequently Asked Questions section of the Legal River website. Remember, Legal River is NOT to be used for urgent needs. For medical emergencies, dial 911. Now available from your iPhone and Android! Please provide this summary of care documentation to your next provider. Your primary care clinician is listed as Phys Other. If you have any questions after today's visit, please call [phone].

## 2018-06-28 NOTE — PROGRESS NOTES
Return OB Visit    Pt doing well. States good FM, no LOF, no VB. Visit Vitals    /78 (BP 1 Location: Left arm, BP Patient Position: Sitting)    Wt 224 lb (101.6 kg)    LMP 10/18/2017 (Approximate)    BMI 34.06 kg/m2      See exam on prenatal record/reviewed     Plan routine OB care  Cx. LTC. Labor precautions given with handout. RTO 1 weeks. All questions addressed. Pt. Voices understanding of treatment plan. Follow-up Disposition:  Return in about 1 week (around 7/5/2018) for Angelito Juárez 9038.       Darcia Severe, RN, [facility]

## 2018-07-03 ENCOUNTER — ROUTINE PRENATAL (OUTPATIENT)
Dept: OBGYN CLINIC | Age: 33
End: 2018-07-03

## 2018-07-03 VITALS
SYSTOLIC BLOOD PRESSURE: 124 MMHG | BODY MASS INDEX: 33.86 KG/M2 | RESPIRATION RATE: 16 BRPM | HEIGHT: 68 IN | HEART RATE: 73 BPM | DIASTOLIC BLOOD PRESSURE: 72 MMHG | TEMPERATURE: 98.9 F | WEIGHT: 223.4 LBS

## 2018-07-03 DIAGNOSIS — Z34.93 PRENATAL CARE IN THIRD TRIMESTER: Primary | ICD-10-CM

## 2018-07-03 NOTE — MR AVS SNAPSHOT
[address] [facility] [address] 1400 [address] [place] [address] 
[phone] Patient: Maura Rosenthal MRN: [id number] GV Visit Information Date & Time Provider Department Dept. Phone Encounter #  
 7/3/2018  1:30 PM URI Leroy 2220 [facility] OBGYN AT [address] [id number] Follow-up Instructions Return in about 1 week (around 7/10/2018) for Angelito Juárez 9038. 2018  1:30 PM  
OB VISIT with URI Leroymariann 43 OBGYN AT [facility] Appt Note: [place] [address] [facility] [address]  
  
    
 2018  1:30 PM  
OB VISIT with Cristel Wynn NP  
[address] [facility] ([facility]) Appt Note: ob visit [place] [address] 1400 [address] [place] [address]  
[phone] Upcoming Health Maintenance Date Due  
 OB 3RD TRIMESTER TDAP 2018 Influenza Age 5 to Adult 2018 PAP AKA CERVICAL CYTOLOGY 2021 Allergies as of 7/3/2018  Review Complete On: 7/3/2018 By: Cristel Wynn NP No Known Allergies Current Immunizations  Never Reviewed No immunizations on file. Not reviewed this visit Vitals Pulse Temp Resp Height(growth percentile) Weight(growth percentile) LMP  
 73 98.9 °F (37.2 °C) (Oral) 16 5' 8\" (1.727 m) 223 lb 6.4 oz (101.3 kg) 10/18/2017 (Approximate) BMI OB Status Smoking Status 33.97 kg/m2 Pregnant Never Smoker Vitals History BMI and BSA Data Body Mass Index Body Surface Area  
 33.97 kg/m 2 2.2 m 2 Preferred Pharmacy Pharmacy Name Phone [facility] [address] [address]  [place] [phone] Your Updated Medication List  
  
   
 This list is accurate as of 7/3/18  2:10 PM.  Always use your most recent med list.  
  
  
  
  
 ferrous sulfate 325 mg (65 mg iron) tablet Take 1 Tab by mouth Daily (before breakfast). PNV Combo #19-Iron-Fol Ac-DHA 22-6-1-200 mg Cap Take 1 Tab by mouth daily. Follow-up Instructions Return in about 1 week (around 7/10/2018) for Angelito Nieves. Patient Instructions Pain Relief During Labor: Care Instructions Your Care Instructions You can choose from a few types of pain relief for childbirth. These include: · Medicine. Your doctor may offer different types of pain medicine while you are in labor. · Breathing techniques. · Comfort measures. This is often called natural childbirth. You also can use a combination of these choices. You can write down your choices for pain relief in a birth plan. A birth plan is a list of what you want during labor. Your personal needs are important when you make this choice. The right choice is the one that feels right to you. Every labor is different. Some women go into labor planning to have natural childbirth and later find that they need pain relief. Plan for what you want. But be aware that you may change your mind during labor. Follow-up care is a key part of your treatment and safety. Be sure to make and go to all appointments, and call your doctor if you are having problems. It's also a good idea to know your test results and keep a list of the medicines you take. What medicines can you use for pain relief? · Local anesthesia: You get a shot of medicine to numb the area if your doctor needs to make a cut to widen the vaginal opening. · Regional anesthesia: A doctor can inject medicine into a space around the spinal cord. This is called an epidural. It can be used during labor to numb your lower body. But lack of feeling sometimes makes it harder to push.  Your doctor can give other shots of pain medicine into the pelvic area to numb the area. · Opioids: These are pain relievers given through a vein. They include nalbuphine (Nubain), butorphanol (Stadol), and fentanyl. They can ease anxiety and pain. But they don't stop pain completely. Usually they aren't used right before delivery because they can slow the baby's breathing. What comfort measures can you use for pain relief? · Breathing techniques: Breathing in a rhythm can distract you from pain. Childbirth classes can teach you how to do focused breathing. · Distraction: You can walk, play cards, listen to music, watch TV, take a shower, or read. These can help take your mind off your contractions. · Massage: Your birth partner can massage your shoulders and lower back during contractions. Strong massage of the back muscles during contractions may reduce back labor pain. · Imagery: You can imagine a peaceful place. For instance, you can think of contractions as waves rolling over you. When should you call for help? Watch closely for changes in your health, and be sure to contact your doctor if: 
? · You want to learn more about pain relief. Where can you learn more? Go to http://aubreyMirubeepranav.info/. Enter Terrell Susy in the search box to learn more about \"Pain Relief During Labor: Care Instructions. \" Current as of: March 16, 2017 Content Version: 11.4 © 3576-1073 [employer]. Care instructions adapted under license by [employer] (which disclaims liability or warranty for this information). If you have questions about a medical condition or this instruction, always ask your healthcare professional. [place] [address] any warranty or liability for your use of this information. [place] Contractions: Care Instructions Your Care Instructions [place] Cam contractions prepare your uterus for labor. Think of them as a \"warm-up\" exercise that your body does.  You may begin to feel them between the 28th and 30th weeks of your pregnancy. But they start as early as the 20th week. Pr[place] Cam contractions usually occur more often during the ninth month. They may go away when you are active and return when you rest. These contractions are like mild contractions of true labor, but they occur less often. (You feel fewer than 8 in an hour.) They don't cause your cervix to open. It may be hard for you to tell the difference between [facility] contractions and true labor, especially in your first pregnancy. Follow-up care is a key part of your treatment and safety. Be sure to make and go to all appointments, and call your doctor if you are having problems. It's also a good idea to know your test results and keep a list of the medicines you take. How can you care for yourself at home? · Try a warm bath to help relieve muscle tension and reduce pain. · Change positions every 30 minutes. Take breaks if you must sit for a long time. Get up and walk around. · Drink plenty of water, enough so that your urine is light yellow or clear like water. · Taking short walks may help you feel better. Your doctor needs to check any contractions that are getting stronger or closer together. Where can you learn more? Go to http://aubrey-pranav.info/. Enter [id number] in the search box to learn more about \"[place] Cam Contractions: Care Instructions. \" Current as of: March 16, 2017 Content Version: 11.4 © 1405-9321 [employer]. Care instructions adapted under license by Brandfolder (which disclaims liability or warranty for this information). If you have questions about a medical condition or this instruction, always ask your healthcare professional. [place] [address] any warranty or liability for your use of this information. Introducing [facility] [place] [facility]!    
 Gage Long introduces TrustCloud patient portal. Now you can access parts of your medical record, email your doctor's office, and request medication refills online. 1. In your internet browser, go to [email or web address] S.A.P.I. de C.V.. [email or web address] S.A.P.I. de C.V. 2. Click on the First Time User? Click Here link in the Sign In box. You will see the New Member Sign Up page. 3. Enter your Magicblox Access Code exactly as it appears below. You will not need to use this code after youve completed the sign-up process. If you do not sign up before the expiration date, you must request a new code. · Magicblox Access Code: [email or web address] Expires: 10/1/2018  2:08 PM 
 
4. Enter the last four digits of your Social Security Number (xxxx) and Date of Birth (mm/dd/yyyy) as indicated and click Submit. You will be taken to the next sign-up page. 5. Create a Magicblox ID. This will be your Magicblox login ID and cannot be changed, so think of one that is secure and easy to remember. 6. Create a Magicblox password. You can change your password at any time. 7. Enter your Password Reset Question and Answer. This can be used at a later time if you forget your password. 8. Enter your e-mail address. You will receive e-mail notification when new information is available in [address] 9. Click Sign Up. You can now view and download portions of your medical record. 10. Click the Download Summary menu link to download a portable copy of your medical information. If you have questions, please visit the Frequently Asked Questions section of the Magicblox website. Remember, Magicblox is NOT to be used for urgent needs. For medical emergencies, dial 911. Now available from your iPhone and Android! Please provide this summary of care documentation to your next provider. Your primary care clinician is listed as Phys Other. If you have any questions after today's visit, please call [phone].

## 2018-07-03 NOTE — LETTER
To Whom it May Concern: 
 
Opal Proctor is a patient at [facility]. She is currently pregnant and due on July 25, 2018. She will no longer be able to work effective close of business on July 3, 2018 to begin maternity leave due to fatigue and mild edema. We anticipate an approximate recovery period after delivery of 6 weeks. Please contact the office with any questions. ? Sincerely, Marylen Gordon, [address] [place] [address] Dr Román Paredes, [address] [place], [address] 
[phone]

## 2018-07-03 NOTE — PATIENT INSTRUCTIONS
Pain Relief During Labor: Care Instructions  Your Care Instructions    You can choose from a few types of pain relief for childbirth. These include:  · Medicine. Your doctor may offer different types of pain medicine while you are in labor. · Breathing techniques. · Comfort measures. This is often called natural childbirth. You also can use a combination of these choices. You can write down your choices for pain relief in a birth plan. A birth plan is a list of what you want during labor. Your personal needs are important when you make this choice. The right choice is the one that feels right to you. Every labor is different. Some women go into labor planning to have natural childbirth and later find that they need pain relief. Plan for what you want. But be aware that you may change your mind during labor. Follow-up care is a key part of your treatment and safety. Be sure to make and go to all appointments, and call your doctor if you are having problems. It's also a good idea to know your test results and keep a list of the medicines you take. What medicines can you use for pain relief? · Local anesthesia: You get a shot of medicine to numb the area if your doctor needs to make a cut to widen the vaginal opening. · Regional anesthesia: A doctor can inject medicine into a space around the spinal cord. This is called an epidural. It can be used during labor to numb your lower body. But lack of feeling sometimes makes it harder to push. Your doctor can give other shots of pain medicine into the pelvic area to numb the area. · Opioids: These are pain relievers given through a vein. They include nalbuphine (Nubain), butorphanol (Stadol), and fentanyl. They can ease anxiety and pain. But they don't stop pain completely. Usually they aren't used right before delivery because they can slow the baby's breathing. What comfort measures can you use for pain relief?   · Breathing techniques: Breathing in a rhythm can distract you from pain. Childbirth classes can teach you how to do focused breathing. · Distraction: You can walk, play cards, listen to music, watch TV, take a shower, or read. These can help take your mind off your contractions. · Massage: Your birth partner can massage your shoulders and lower back during contractions. Strong massage of the back muscles during contractions may reduce back labor pain. · Imagery: You can imagine a peaceful place. For instance, you can think of contractions as waves rolling over you. When should you call for help? Watch closely for changes in your health, and be sure to contact your doctor if:  ? · You want to learn more about pain relief. Where can you learn more? Go to http://aubreyJMB Energiepranav.info/. Enter Nahun Giraldoa in the search box to learn more about \"Pain Relief During Labor: Care Instructions. \"  Current as of: March 16, 2017  Content Version: 11.4  © 0047-4180 [employer]. Care instructions adapted under license by [employer] (which disclaims liability or warranty for this information). If you have questions about a medical condition or this instruction, always ask your healthcare professional. [place] [address] any warranty or liability for your use of this information. [facility] Contractions: Care Instructions  Your Care Instructions    [place] Cam contractions prepare your uterus for labor. Think of them as a \"warm-up\" exercise that your body does. You may begin to feel them between the 28th and 30th weeks of your pregnancy. But they start as early as the 20th week. [place] Cam contractions usually occur more often during the ninth month. They may go away when you are active and return when you rest. These contractions are like mild contractions of true labor, but they occur less often. (You feel fewer than 8 in an hour.) They don't cause your cervix to open.   It may be hard for you to tell the difference between [facility] contractions and true labor, especially in your first pregnancy. Follow-up care is a key part of your treatment and safety. Be sure to make and go to all appointments, and call your doctor if you are having problems. It's also a good idea to know your test results and keep a list of the medicines you take. How can you care for yourself at home? · Try a warm bath to help relieve muscle tension and reduce pain. · Change positions every 30 minutes. Take breaks if you must sit for a long time. Get up and walk around. · Drink plenty of water, enough so that your urine is light yellow or clear like water. · Taking short walks may help you feel better. Your doctor needs to check any contractions that are getting stronger or closer together. Where can you learn more? Go to http://aubrey-pranav.info/. Enter [id number] in the search box to learn more about \"[place] Cam Contractions: Care Instructions. \"  Current as of: March 16, 2017  Content Version: 11.4  © 0892-6448 Healthwise, Incorporated. Care instructions adapted under license by [employer] (which disclaims liability or warranty for this information). If you have questions about a medical condition or this instruction, always ask your healthcare professional. [address] any warranty or liability for your use of this information.

## 2018-07-03 NOTE — PROGRESS NOTES
Return OB Visit    Pt doing OK. She does report fatigue and the desire to discontinue work effective close of business today. States good FM, no LOF, no VB. Visit Vitals    Pulse 73    Temp 98.9 °F (37.2 °C) (Oral)    Resp 16    Ht 5' 8\" (1.727 m)    Wt 223 lb 6.4 oz (101.3 kg)    LMP 10/18/2017 (Approximate)    BMI 33.97 kg/m2      See exam on prenatal record/reviewed     Plan routine OB care  Cx. LTC>  RTO 1 week. All questions addressed. Pt. Voices understanding of treatment plan. Follow-up Disposition:  Return in about 1 week (around 7/10/2018) for Parvlisa Franck 9038.       Clement Parsons RN, [facility]

## 2018-07-12 ENCOUNTER — ROUTINE PRENATAL (OUTPATIENT)
Dept: OBGYN CLINIC | Age: 33
End: 2018-07-12

## 2018-07-12 VITALS
SYSTOLIC BLOOD PRESSURE: 119 MMHG | HEART RATE: 77 BPM | HEIGHT: 68 IN | DIASTOLIC BLOOD PRESSURE: 83 MMHG | BODY MASS INDEX: 33.68 KG/M2 | WEIGHT: 222.2 LBS

## 2018-07-12 DIAGNOSIS — Z34.93 PRENATAL CARE IN THIRD TRIMESTER: Primary | ICD-10-CM

## 2018-07-12 NOTE — PROGRESS NOTES
Chief Complaint   Patient presents with    Routine Prenatal Visit     Pt presents in stable condition, no complaints voiced.     Leuk--1+

## 2018-07-12 NOTE — PATIENT INSTRUCTIONS
Learning About Sibling Rivalry  What is sibling rivalry? Often an older child will feel jealous when you have a new baby. It may take a few months before a child shows signs of these feelings. But after some time, your child will realize that the baby is there to stay. That's when you may start to see strong emotions and behavior problems. This is called sibling rivalry. Sibling rivalry happens between older children too. Children have a strong need for attention. And they need positive feedback from their parents. Conflict often happens because children feel they are competing with their siblings for this attention. How can you prepare for sibling rivalry? Before a new baby arrives:  · Talk to older siblings about the new baby early in the pregnancy. Talk to your child about his or her special role in the family. For instance, you might say, \"You are the baby's only big brother. You can help us teach her about life. That's very special.\"  · Ask your child's opinion when getting ready for the baby. You might ask about decorating the baby's room or picking out clothes. · Visit your local [facility] or [facility] to find books that teach your child about having a new baby in the home. · Remember that sibling rivalry often gets more intense after the baby is past the  stage. This is when your older child realizes that this life change is here to stay. Plan ahead for these changes by learning how to manage this behavior. How can you manage sibling rivalry? After a baby is born  · Give your older child a role in daily care. Your child can help bring diapers to you, choose the baby's clothing, hold the baby, be in charge of a toy, or push the stroller. · Praise your child for helping with the baby. Remind your child how important his or her role is. · Plan time alone with your older child. · Reassure your child that your love for the baby will not replace the love you have for him or her.   · Prepare your child for not getting all the attention. Say things like, \"Your baby brother will get a lot of attention at the party. That's because people love babies, not because they don't like you. \"  · Give your child extra attention when the baby is being fawned over. Ask close friends or relatives to give the older child extra attention at these times. With older siblings  · Encourage siblings to work out problems on their own. Make sure they are solving the problem in a respectful way. Don't let one child take advantage of the other. · Separate siblings when needed, such as when a conflict is not being resolved in a respectful or reasonable way. Sometimes your children will need their own space. · Don't pick sides. Show your children that you love and support them equally. Avoid comparing them to each other. · If you have to help manage a bigger conflict, treat each sibling fairly. Listen to both sides. · Teach your children healthy ways to disagree and resolve conflicts. Set guidelines. Children may need to be told not to yell, hit, or call each other names. Where can you learn more? Go to http://aubrey-pranav.info/. Enter U245 in the search box to learn more about \"Learning About Sibling Rivalry. \"  Current as of: May 12, 2017  Content Version: 11.7  © 4274-1902 [employer] Ultimate [employer], [employer]. Care instructions adapted under license by [employer] (which disclaims liability or warranty for this information). If you have questions about a medical condition or this instruction, always ask your healthcare professional. [place] [address] any warranty or liability for your use of this information.

## 2018-07-12 NOTE — MR AVS SNAPSHOT
[address] [facility] [address] [facility] 
[phone] Patient: Aubrey Pierre MRN: [id number] OEN:[id number] Visit Information Date & Time Provider Department Dept. Phone Encounter #  
 7/12/2018  1:30 PM URI Morton [address] [facility] OBGYN AT [address] [facility] [id number] Follow-up Instructions Return in about 1 week (around 7/19/2018) for Angelito Juárez 9038. 7/19/2018  1:30 PM  
OB VISIT with URI Mortonmariann 43 OBGYN AT [facility] ([facility]) Appt Note: ob visit [place] [address] [facility] [id number] [phone]  
  
   
 [facility] [address] [facility] Upcoming Health Maintenance Date Due  
 OB 3RD TRIMESTER TDAP 4/25/2018 Influenza Age 5 to Adult 8/1/2018 PAP AKA CERVICAL CYTOLOGY 1/2/2021 Allergies as of 7/12/2018  Review Complete On: 7/12/2018 By: Fide Barrett NP No Known Allergies Current Immunizations  Never Reviewed No immunizations on file. Not reviewed this visit Vitals BP Pulse Height(growth percentile) Weight(growth percentile) LMP BMI  
 119/83 (BP 1 Location: Left arm, BP Patient Position: Sitting) 77 5' 8\" (1.727 m) 222 lb 3.2 oz (100.8 kg) 10/18/2017 (Approximate) 33.79 kg/m2 OB Status Smoking Status Pregnant Never Smoker Vitals History BMI and BSA Data Body Mass Index Body Surface Area 33.79 kg/m 2 2.2 m 2 Preferred Pharmacy Pharm[facility] Name Phone [facility]  [place] [phone] Your Updated Medication List  
  
   
This list is accurate as of 7/12/18  2:13 PM.  Always use your most recent med list.  
  
  
  
  
 ferrous sulfate 325 mg (65 mg iron) tablet Take 1 Tab by mouth Daily (before breakfast). PNV Combo #19-Iron-Fol Ac-DHA 22-6-1-200 mg Cap Take 1 Tab by mouth daily. Follow-up Instructions Return in about 1 week (around 2018) for Angelito Nieves. Patient Instructions Learning About Sibling Rivalry What is sibling rivalry? Often an older child will feel jealous when you have a new baby. It may take a few months before a child shows signs of these feelings. But after some time, your child will realize that the baby is there to stay. That's when you may start to see strong emotions and behavior problems. This is called sibling rivalry. Sibling rivalry happens between older children too. Children have a strong need for attention. And they need positive feedback from their parents. Conflict often happens because children feel they are competing with their siblings for this attention. How can you prepare for sibling rivalry? Before a new baby arrives: · Talk to older siblings about the new baby early in the pregnancy. Talk to your child about his or her special role in the family. For instance, you might say, \"You are the baby's only big brother. You can help us teach her about life. That's very special.\" 
· Ask your child's opinion when getting ready for the baby. You might ask about decorating the baby's room or picking out clothes. · Visit your local [place] [address] or [employer] to find books that teach your child about having a new baby in the home. · Remember that sibling rivalry often gets more intense after the baby is past the  stage. This is when your older child realizes that this life change is here to stay. Plan ahead for these changes by learning how to manage this behavior. How can you manage sibling rivalry? After a baby is born · Give your older child a role in daily care. Your child can help bring diapers to you, choose the baby's clothing, hold the baby, be in charge of a toy, or push the stroller. · Praise your child for helping with the baby. Remind your child how important his or her role is. · Plan time alone with your older child. · Reassure your child that your love for the baby will not replace the love you have for him or her. · Prepare your child for not getting all the attention. Say things like, \"Your baby brother will get a lot of attention at the party. That's because people love babies, not because they don't like you. \" · Give your child extra attention when the baby is being fawned over. Ask close friends or relatives to give the older child extra attention at these times. With older siblings · Encourage siblings to work out problems on their own. Make sure they are solving the problem in a respectful way. Don't let one child take advantage of the other. · Separate siblings when needed, such as when a conflict is not being resolved in a respectful or reasonable way. Sometimes your children will need their own space. · Don't pick sides. Show your children that you love and support them equally. Avoid comparing them to each other. · If you have to help manage a bigger conflict, treat each sibling fairly. Listen to both sides. · Teach your children healthy ways to disagree and resolve conflicts. Set guidelines. Children may need to be told not to yell, hit, or call each other names. Where can you learn more? Go to http://aubrey-pranav.info/. Enter U245 in the search box to learn more about \"Learning About Sibling Rivalry. \" Current as of: May 12, 2017 Content Version: 11.7 © 0316-2679 [employer]. Care instructions adapted under license by [employer] (which disclaims liability or warranty for this information). If you have questions about a medical condition or this instruction, always ask your healthcare professional. [place] [address] any warranty or liability for your use of this information. Introducing \[facility]\"" & HEALTH SERVICES!    
 [employer] introduces [employer] patient portal. Now you can access parts of your medical record, email your doctor's office, and request medication refills online. 1. In your internet browser, go to [email or web address] 2. Click on the First Time User? Click Here link in the Sign In box. You will see the New Member Sign Up page. 3. Enter your Tippr Access Code exactly as it appears below. You will not need to use this code after youve completed the sign-up process. If you do not sign up before the expiration date, you must request a new code. · Tippr Access Code: [email or web address] Expires: 10/1/2018  2:08 PM 
 
4. Enter the last four digits of your Social Security Number (xxxx) and Date of Birth (mm/dd/yyyy) as indicated and click Submit. You will be taken to the next sign-up page. 5. Create a Tippr ID. This will be your Tippr login ID and cannot be changed, so think of one that is secure and easy to remember. 6. Create a Tippr password. You can change your password at any time. 7. Enter your Password Reset Question and Answer. This can be used at a later time if you forget your password. 8. Enter your e-mail address. You will receive e-mail notification when new information is available in [address] 9. Click Sign Up. You can now view and download portions of your medical record. 10. Click the Download Summary menu link to download a portable copy of your medical information. If you have questions, please visit the Frequently Asked Questions section of the Tippr website. Remember, Tippr is NOT to be used for urgent needs. For medical emergencies, dial 911. Now available from your iPhone and Android! Please provide this summary of care documentation to your next provider. Your primary care clinician is listed as Phys Other. If you have any questions after today's visit, please call [phone].

## 2018-07-12 NOTE — PROGRESS NOTES
Return OB Visit    Pt doing well. States good FM, no LOF, no VB. Visit Vitals    /83 (BP 1 Location: Left arm, BP Patient Position: Sitting)    Pulse 77    Ht 5' 8\" (1.727 m)    Wt 222 lb 3.2 oz (100.8 kg)    LMP 10/18/2017 (Approximate)    BMI 33.79 kg/m2      See exam on prenatal record/reviewed   Cx. Slight improved. Sibling adjustment discussed. RTO 1 weeks. All questions addressed. Pt. Voices understanding of treatment plan. Follow-up Disposition:  Return in about 1 week (around 7/19/2018) for Angelito Juárez 9038.       Hansa Chamorro RN, [facility]

## 2018-07-19 ENCOUNTER — ANESTHESIA EVENT (OUTPATIENT)
Dept: LABOR AND DELIVERY | Age: 33
End: 2018-07-19
Payer: COMMERCIAL

## 2018-07-19 ENCOUNTER — TELEPHONE (OUTPATIENT)
Dept: OBGYN CLINIC | Age: 33
End: 2018-07-19

## 2018-07-19 ENCOUNTER — HOSPITAL ENCOUNTER (INPATIENT)
Age: 33
LOS: 2 days | Discharge: HOME | End: 2018-07-21
Attending: OBSTETRICS & GYNECOLOGY | Admitting: OBSTETRICS & GYNECOLOGY
Payer: COMMERCIAL

## 2018-07-19 ENCOUNTER — ANESTHESIA (OUTPATIENT)
Dept: LABOR AND DELIVERY | Age: 33
End: 2018-07-19
Payer: COMMERCIAL

## 2018-07-19 ENCOUNTER — ROUTINE PRENATAL (OUTPATIENT)
Dept: OBGYN CLINIC | Age: 33
End: 2018-07-19

## 2018-07-19 VITALS
WEIGHT: 223.6 LBS | BODY MASS INDEX: 33.89 KG/M2 | DIASTOLIC BLOOD PRESSURE: 78 MMHG | SYSTOLIC BLOOD PRESSURE: 120 MMHG | HEIGHT: 68 IN | HEART RATE: 92 BPM

## 2018-07-19 DIAGNOSIS — O12.13 PROTEINURIA AFFECTING PREGNANCY IN THIRD TRIMESTER: Primary | ICD-10-CM

## 2018-07-19 PROBLEM — O42.90 PROM (PREMATURE RUPTURE OF MEMBRANES): Status: ACTIVE | Noted: 2018-07-19

## 2018-07-19 LAB
ERYTHROCYTE [DISTWIDTH] IN BLOOD BY AUTOMATED COUNT: 18.2 % (ref 11.5–14.5)
HCT VFR BLD AUTO: 34.3 % (ref 35–47)
HGB BLD-MCNC: 10.6 G/DL (ref 11.5–16)
MCH RBC QN AUTO: 23.8 PG (ref 26–34)
MCHC RBC AUTO-ENTMCNC: 30.9 G/DL (ref 30–36.5)
MCV RBC AUTO: 76.9 FL (ref 80–99)
NRBC # BLD: 0 K/UL (ref 0–0.01)
NRBC BLD-RTO: 0 PER 100 WBC
PLATELET # BLD AUTO: 144 K/UL (ref 150–400)
RBC # BLD AUTO: 4.46 M/UL (ref 3.8–5.2)
WBC # BLD AUTO: 8.6 K/UL (ref 3.6–11)

## 2018-07-19 PROCEDURE — 74011000258 HC RX REV CODE- 258: Performed by: OBSTETRICS & GYNECOLOGY

## 2018-07-19 PROCEDURE — 65410000002 HC RM PRIVATE OB

## 2018-07-19 PROCEDURE — 75410000003 HC RECOV DEL/VAG/CSECN EA 0.5 HR

## 2018-07-19 PROCEDURE — 74011250636 HC RX REV CODE- 250/636: Performed by: OBSTETRICS & GYNECOLOGY

## 2018-07-19 PROCEDURE — 74011250636 HC RX REV CODE- 250/636: Performed by: ANESTHESIOLOGY

## 2018-07-19 PROCEDURE — 85027 COMPLETE CBC AUTOMATED: CPT | Performed by: OBSTETRICS & GYNECOLOGY

## 2018-07-19 PROCEDURE — 77030002933 HC SUT MCRYL J&J -A

## 2018-07-19 PROCEDURE — 36415 COLL VENOUS BLD VENIPUNCTURE: CPT | Performed by: OBSTETRICS & GYNECOLOGY

## 2018-07-19 PROCEDURE — 74011250636 HC RX REV CODE- 250/636

## 2018-07-19 PROCEDURE — 0KQM0ZZ REPAIR PERINEUM MUSCLE, OPEN APPROACH: ICD-10-PCS | Performed by: OBSTETRICS & GYNECOLOGY

## 2018-07-19 PROCEDURE — 75410000002 HC LABOR FEE PER 1 HR

## 2018-07-19 PROCEDURE — 75410000000 HC DELIVERY VAGINAL/SINGLE

## 2018-07-19 PROCEDURE — 74011250637 HC RX REV CODE- 250/637: Performed by: OBSTETRICS & GYNECOLOGY

## 2018-07-19 RX ORDER — OXYTOCIN IN 5 % DEXTROSE 30/500 ML
PLASTIC BAG, INJECTION (ML) INTRAVENOUS
Status: COMPLETED
Start: 2018-07-19 | End: 2018-07-19

## 2018-07-19 RX ORDER — ONDANSETRON 2 MG/ML
4 INJECTION INTRAMUSCULAR; INTRAVENOUS
Status: DISCONTINUED | OUTPATIENT
Start: 2018-07-19 | End: 2018-07-21 | Stop reason: HOSPADM

## 2018-07-19 RX ORDER — BUPIVACAINE HYDROCHLORIDE 5 MG/ML
30 INJECTION, SOLUTION EPIDURAL; INTRACAUDAL AS NEEDED
Status: DISCONTINUED | OUTPATIENT
Start: 2018-07-19 | End: 2018-07-19

## 2018-07-19 RX ORDER — HYDROCORTISONE ACETATE PRAMOXINE HCL 2.5; 1 G/100G; G/100G
CREAM TOPICAL AS NEEDED
Status: DISCONTINUED | OUTPATIENT
Start: 2018-07-19 | End: 2018-07-21 | Stop reason: HOSPADM

## 2018-07-19 RX ORDER — BUPIVACAINE HYDROCHLORIDE 5 MG/ML
30 INJECTION, SOLUTION EPIDURAL; INTRACAUDAL ONCE
Status: DISCONTINUED | OUTPATIENT
Start: 2018-07-19 | End: 2018-07-19

## 2018-07-19 RX ORDER — EPHEDRINE SULFATE 50 MG/ML
12.5 INJECTION, SOLUTION INTRAVENOUS
Status: DISCONTINUED | OUTPATIENT
Start: 2018-07-19 | End: 2018-07-19

## 2018-07-19 RX ORDER — ZOLPIDEM TARTRATE 5 MG/1
5 TABLET ORAL
Status: DISCONTINUED | OUTPATIENT
Start: 2018-07-19 | End: 2018-07-21 | Stop reason: HOSPADM

## 2018-07-19 RX ORDER — FENTANYL/BUPIVACAINE/NS/PF 2-1250MCG
1-16 PREFILLED PUMP RESERVOIR EPIDURAL CONTINUOUS
Status: DISCONTINUED | OUTPATIENT
Start: 2018-07-19 | End: 2018-07-19

## 2018-07-19 RX ORDER — SODIUM CHLORIDE, SODIUM LACTATE, POTASSIUM CHLORIDE, CALCIUM CHLORIDE 600; 310; 30; 20 MG/100ML; MG/100ML; MG/100ML; MG/100ML
125 INJECTION, SOLUTION INTRAVENOUS CONTINUOUS
Status: DISCONTINUED | OUTPATIENT
Start: 2018-07-19 | End: 2018-07-19

## 2018-07-19 RX ORDER — IBUPROFEN 400 MG/1
800 TABLET ORAL EVERY 8 HOURS
Status: DISCONTINUED | OUTPATIENT
Start: 2018-07-19 | End: 2018-07-21 | Stop reason: HOSPADM

## 2018-07-19 RX ORDER — NALOXONE HYDROCHLORIDE 0.4 MG/ML
0.4 INJECTION, SOLUTION INTRAMUSCULAR; INTRAVENOUS; SUBCUTANEOUS AS NEEDED
Status: DISCONTINUED | OUTPATIENT
Start: 2018-07-19 | End: 2018-07-21 | Stop reason: HOSPADM

## 2018-07-19 RX ORDER — IBUPROFEN 400 MG/1
800 TABLET ORAL EVERY 8 HOURS
Status: DISCONTINUED | OUTPATIENT
Start: 2018-07-19 | End: 2018-07-19

## 2018-07-19 RX ORDER — NALOXONE HYDROCHLORIDE 0.4 MG/ML
0.4 INJECTION, SOLUTION INTRAMUSCULAR; INTRAVENOUS; SUBCUTANEOUS AS NEEDED
Status: DISCONTINUED | OUTPATIENT
Start: 2018-07-19 | End: 2018-07-19 | Stop reason: SDUPTHER

## 2018-07-19 RX ORDER — OXYCODONE AND ACETAMINOPHEN 5; 325 MG/1; MG/1
1-2 TABLET ORAL
Status: DISCONTINUED | OUTPATIENT
Start: 2018-07-19 | End: 2018-07-21 | Stop reason: HOSPADM

## 2018-07-19 RX ORDER — NALBUPHINE HYDROCHLORIDE 10 MG/ML
10 INJECTION, SOLUTION INTRAMUSCULAR; INTRAVENOUS; SUBCUTANEOUS
Status: DISCONTINUED | OUTPATIENT
Start: 2018-07-19 | End: 2018-07-19

## 2018-07-19 RX ORDER — ACETAMINOPHEN 325 MG/1
650 TABLET ORAL
Status: DISCONTINUED | OUTPATIENT
Start: 2018-07-19 | End: 2018-07-21 | Stop reason: HOSPADM

## 2018-07-19 RX ORDER — SODIUM CHLORIDE, SODIUM LACTATE, POTASSIUM CHLORIDE, CALCIUM CHLORIDE 600; 310; 30; 20 MG/100ML; MG/100ML; MG/100ML; MG/100ML
125 INJECTION, SOLUTION INTRAVENOUS CONTINUOUS
Status: DISCONTINUED | OUTPATIENT
Start: 2018-07-19 | End: 2018-07-21 | Stop reason: HOSPADM

## 2018-07-19 RX ORDER — SODIUM CHLORIDE 0.9 % (FLUSH) 0.9 %
5-10 SYRINGE (ML) INJECTION AS NEEDED
Status: DISCONTINUED | OUTPATIENT
Start: 2018-07-19 | End: 2018-07-21 | Stop reason: HOSPADM

## 2018-07-19 RX ORDER — OXYTOCIN/RINGER'S LACTATE 20/1000 ML
125-500 PLASTIC BAG, INJECTION (ML) INTRAVENOUS ONCE
Status: ACTIVE | OUTPATIENT
Start: 2018-07-19 | End: 2018-07-20

## 2018-07-19 RX ORDER — ZOLPIDEM TARTRATE 10 MG/1
10 TABLET ORAL
Status: DISCONTINUED | OUTPATIENT
Start: 2018-07-19 | End: 2018-07-19 | Stop reason: DRUGHIGH

## 2018-07-19 RX ORDER — FENTANYL CITRATE 50 UG/ML
100 INJECTION, SOLUTION INTRAMUSCULAR; INTRAVENOUS ONCE
Status: DISCONTINUED | OUTPATIENT
Start: 2018-07-19 | End: 2018-07-19

## 2018-07-19 RX ORDER — SODIUM CHLORIDE 0.9 % (FLUSH) 0.9 %
5-10 SYRINGE (ML) INJECTION EVERY 8 HOURS
Status: DISCONTINUED | OUTPATIENT
Start: 2018-07-19 | End: 2018-07-21 | Stop reason: HOSPADM

## 2018-07-19 RX ORDER — FENTANYL CITRATE 50 UG/ML
100 INJECTION, SOLUTION INTRAMUSCULAR; INTRAVENOUS ONCE
Status: DISCONTINUED | OUTPATIENT
Start: 2018-07-19 | End: 2018-07-19 | Stop reason: SDUPTHER

## 2018-07-19 RX ADMIN — Medication 30000 MILLI-UNITS: at 18:11

## 2018-07-19 RX ADMIN — AMPICILLIN SODIUM 2 G: 2 INJECTION, POWDER, FOR SOLUTION INTRAVENOUS at 17:11

## 2018-07-19 RX ADMIN — NALBUPHINE HYDROCHLORIDE 10 MG: 10 INJECTION, SOLUTION INTRAMUSCULAR; INTRAVENOUS; SUBCUTANEOUS at 17:08

## 2018-07-19 RX ADMIN — SODIUM CHLORIDE, SODIUM LACTATE, POTASSIUM CHLORIDE, AND CALCIUM CHLORIDE 125 ML/HR: 600; 310; 30; 20 INJECTION, SOLUTION INTRAVENOUS at 17:00

## 2018-07-19 RX ADMIN — IBUPROFEN 800 MG: 400 TABLET ORAL at 21:01

## 2018-07-19 NOTE — IP AVS SNAPSHOT
[address] [facility] 
[phone] Patient: Rachel Baugh MRN: [id number]:[id number] About your hospitalization You were admitted on:  July 19, 2018 You last received [facility] the:  [facility] You were discharged on:  July 21, 2018 Why you were hospitalized Your primary diagnosis was:  Not on File Your diagnoses also included:  Prom (Premature Rupture Of Membranes) Follow-up Information Follow up With Details Comments Contact Info David Hawkins NP In 6 weeks  [address] [facility] 
[phone] Chery Cox, MD   Patient can only remember the practice name and not the physician Your Scheduled Appointments Tuesday July 24, 2018  1:00 PM EDT  
OB VISIT with David Hawkins NP  
[place] [address] OBGYN AT [facility] ([address]) [facility] [place] [address] [facility]  
[phone] Discharge Orders None A check elke indicates which time of day the medication should be taken. My Medications START taking these medications Instructions Each Dose to Equal  
 Morning Noon Evening Bedtime  
 ibuprofen 600 mg tablet Commonly known as:  MOTRIN Your last dose was: Your next dose is: Take 1 Tab by mouth every eight (8) hours. 600 mg CONTINUE taking these medications Instructions Each Dose to Equal  
 Morning Noon Evening Bedtime  
 ferrous sulfate 325 mg (65 mg iron) tablet Your last dose was: Your next dose is: Take 1 Tab by mouth Daily (before breakfast). 325 mg  
    
   
   
   
  
 PNV Combo #19-Iron-Fol Ac-DHA 22-6-1-200 mg Cap Your last dose was: Your next dose is: Take 1 Tab by mouth daily. 1 Tab Where to Get Your Medications Information on where to get these meds will be given to you by the nurse or doctor. ! Ask your nurse or doctor about these medications  
  ibuprofen 600 mg tablet Discharge Instructions POSTPARTUM DISCHARGE INSTRUCTIONS Name:  Tushar Crockett YOB: 1985 Admission Diagnosis:  PROM (premature rupture of membranes) Discharge Diagnosis:   
Problem List as of 7/21/2018  Date Reviewed: 7/19/2018 Codes Class Noted - Resolved PROM (premature rupture of membranes) ICD-10-CM: O42.90 ICD-9-CM: 658.10  7/19/2018 - Present Pregnant ICD-10-CM: Z34.90 ICD-9-CM: V22.2  1/2/2018 - Present Overview Addendum 5/1/2018  9:18 AM by Wolf Serra NP  
  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 
Pap with + HPV 3 years in a row Uterine fibroids Anemia- on iron supplement 20 week ultrasound - recommends 3rd trimester growth and presentation check Attending Physician:  Tasia Romo MD 
 
Delivery Type:  Vaginal Childbirth with Episiotomy, Laceration or Tear: What To Expect At [address] [facility] Your body will slowly heal in the next few weeks. It is easy to get too tired and overwhelmed during the first weeks after your baby is born. Changes in your hormones can shift your mood without warning. You may find it hard to meet the extra demands on your energy and time. Take it easy on yourself. Follow-up care is a key part of your treatment and safety. Be sure to make and go to all appointments, and call your doctor if you are having problems. It's also a good idea to know your test results and keep a list of the medicines you take. How can you care for yourself at home? Vaginal Bleeding and Cramps · After delivery, you will have a bloody discharge from the vagina. This will turn pink within a week and then white or yellow after about 10 days. It may last for 2 to 4 weeks or longer, until the uterus has healed. Use pads instead of tampons until you stop bleeding. · Do not worry if you pass some blood clots, as long as they are smaller than a golf ball. If you have a tear or stitches in your vaginal area, change the pad at least every 4 hours to prevent soreness and infection. · You may have cramps for the first few days after childbirth. These are normal and occur as the uterus shrinks to normal size. Take an over-the-counter pain medicine, such as acetaminophen (Tylenol), ibuprofen (Advil, Motrin), or naproxen (Aleve), for cramps. Read and follow all instructions on the label. Do not take aspirin, because it can cause more bleeding. Do not take acetaminophen (Tylenol) and other acetaminophen containing medications (i.e. Percocet) at the same time. Episiotomy, Lacerations or Tears · If you have stitches, they will dissolve on their own and do not need to be removed. · Put ice or a cold pack on your painful area for 10 to 20 minutes at a time, several times a day, for the first few days. Put a thin cloth between the ice and your skin. · Sit in a few inches of warm water (sitz bath) 3 times a day and after bowel movements. The warm water helps with pain and itching. If you do not have a tub, a warm shower might help. Breast fullness · Your breasts may overfill (engorge) in the first few days after delivery. To help milk flow and to relieve pain, warm your breasts in the shower or by using warm, moist towels before nursing. · If you are not nursing, do not put warmth on your breasts or touch your breasts. Wear a tight bra or sports bra and use ice until the fullness goes away. This usually takes 2 to 3 days. · Put ice or a cold pack on your breast after nursing to reduce swelling and pain. Put a thin cloth between the ice and your skin. Activity · Eat a balanced diet. Do not try to lose weight by cutting calories.  Keep taking your prenatal vitamins, or take a multivitamin. · Get as much rest as you can. Try to take naps when your baby sleeps during the day. · Get some exercise every day. But do not do any heavy exercise until your doctor says it is okay. · Wait until you are healed (about 4 to 6 weeks) before you have sexual intercourse. Your doctor will tell you when it is okay to have sex. · Talk to your doctor about birth control. You can get pregnant even before your period returns. Also, you can get pregnant while you are breast-feeding. Mental Health · Many women get the \"baby blues\" during the first few days after childbirth. You may lose sleep, feel irritable, and cry easily. You may feel happy one minute and sad the next. Hormone changes are one cause of these emotional changes. Also, the demands of a new baby, along with visits from relatives or other family needs, add to a mother's stress. The \"baby blues\" often peak around the fourth day. Then they ease up in less than 2 weeks. · If your moodiness or anxiety lasts for more than 2 weeks, or if you feel like life is not worth living, you may have postpartum depression. This is different for each mother. Some mothers with serious depression may worry intensely about their infant's well-being. Others may feel distant from their child. Some mothers might even feel that they might harm their baby. A mother may have signs of paranoia, wondering if someone is watching her. · With all the changes in your life, you may not know if you are depressed. Pregnancy sometimes causes changes in how you feel that are similar to the symptoms of depression. · Symptoms of depression include: · Feeling sad or hopeless and losing interest in daily activities. These are the most common symptoms of depression. · Sleeping too much or not enough. · Feeling tired. You may feel as if you have no energy. · Eating too much or too little. · POSTPARTUM SUPPORT INTERNATIONAL (PSI) offers a Warm line; Chat with the Expert phone sessions; Information and Articles about Pregnancy and Postpartum Mood Disorders; Comprehensive List of Free Support Groups; Knowledgeable local coordinators who will offer support, information, and resources; Guide to Resources on [employer]; Calendar of events in the  mood disorders community; Latest News and Research; and [facility] [address] for [employer]. Remember - You are not alone; You are not to blame; With help, you will be well. 4-942-843-PPD(9305). WWW. POSTPARTUM. NET · Writing or talking about death, such as writing suicide notes or talking about guns, knives, or pills. Keep the numbers for these national suicide hotlines: 5-847-717-TALK (6-413.740.1362) and 3-228-VKNXMAB (4-745.553.7058). If you or someone you know talks about suicide or feeling hopeless, get help right away. Constipation and Hemorrhoids Drink plenty of fluids, enough so that your urine is light yellow or clear like water. If you have kidney, heart, or liver disease and have to limit fluids, talk with your doctor before you increase the amount of fluids you drink. · Eat plenty of fiber each day. Have a bran muffin or bran cereal for breakfast, and try eating a piece of fruit for a mid-afternoon snack. · For painful, itchy hemorrhoids, put ice or a cold pack on the area several times a day for 10 minutes at a time. Follow this by putting a warm compress on the area for another 10 to 20 minutes or by sitting in a shallow, warm bath. When should you call for help? Call 911 anytime you think you may need emergency care. For example, call if: 
· You are thinking of hurting yourself, your baby, or anyone else. · You passed out (lost consciousness). · You have symptoms of a blood clot in your lung (called a pulmonary embolism). These may include: 
· Sudden chest pain. · Trouble breathing. · Coughing up blood. Call your doctor now or seek immediate medical care if: 
· You have severe vaginal bleeding. · You are soaking through a pad each hour for 2 or more hours. · Your vaginal bleeding seems to be getting heavier or is still bright red 4 days after delivery. · You are dizzy or lightheaded, or you feel like you may faint. · You are vomiting or cannot keep fluids down. · You have a fever. · You have new or more belly pain. · You pass tissue (not just blood). · Your vaginal discharge smells bad. · Your belly feels tender or full and hard. · Your breasts are continuously painful or red. · You feel sad, anxious, or hopeless for more than a few days. · You have sudden, severe pain in your belly. · You have symptoms of a blood clot in your leg (called a deep vein thrombosis), such as: 
· Pain in your calf, back of the knee, thigh, or groin. · Redness and swelling in your leg or groin. · You have symptoms of preeclampsia, such as: 
· Sudden swelling of your face, hands, or feet. · New vision problems (such as dimness or blurring). · A severe headache. · Your blood pressure is higher than it should be or rises suddenly. · You have new nausea or vomiting. Watch closely for changes in your health, and be sure to contact your doctor if you have any problems. Additional Information:  Not applicable These are general instructions for a healthy lifestyle: No smoking/ No tobacco products/ Avoid exposure to second hand smoke Surgeon General's Warning:  Quitting smoking now greatly reduces serious risk to your health. Obesity, smoking, and sedentary lifestyle greatly increases your risk for illness A healthy diet, regular physical exercise & weight monitoring are important for maintaining a healthy lifestyle Recognize signs and symptoms of STROKE: 
 
F-face looks uneven A-arms unable to move or move unevenly S-speech slurred or non-existent T-time-call 911 as soon as signs and symptoms begin - DO NOT go  
    back to bed or wait to see if you get better - TIME IS BRAIN. I have had the opportunity to make my options or choices for discharge. I have received and understand these instructions. [employer]Ad Announcement We are excited to announce that we are making your provider's discharge notes available to you in StrikeAd. You will see these notes when they are completed and signed by the physician that discharged you from your recent hospital stay. If you have any questions or concerns about any information you see in StrikeAd, please call the Health Information Department where you were seen or reach out to your Primary Care Provider for more information about your plan of care. Introducing [facility]! Moo Osborne introduces StrikeAd patient portal. Now you can access parts of your medical record, email your doctor's office, and request medication refills online. 1. In your internet browser, go to [email or web address] 2. Click on the First Time User? Click Here link in the Sign In box. You will see the New Member Sign Up page. 3. Enter your StrikeAd Access Code exactly as it appears below. You will not need to use this code after youve completed the sign-up process. If you do not sign up before the expiration date, you must request a new code. · StrikeAd Access Code: [email or web address] Expires: 10/1/2018  2:08 PM 
 
4. Enter the last four digits of your Social Security Number (xxxx) and Date of Birth (mm/dd/yyyy) as indicated and click Submit. You will be taken to the next sign-up page. 5. Create a StrikeAd ID. This will be your StrikeAd login ID and cannot be changed, so think of one that is secure and easy to remember. 6. Create a StrikeAd password. You can change your password at any time. 7. Enter your Password Reset Question and Answer.  This can be used at a later time if you forget your password. 8. Enter your e-mail address. You will receive e-mail notification when new information is available in [address] 9. Click Sign Up. You can now view and download portions of your medical record. 10. Click the Download Summary menu link to download a portable copy of your medical information. If you have questions, please visit the Frequently Asked Questions section of the [email or web address] website. Remember, [employer] is NOT to be used for urgent needs. For medical emergencies, dial 911. Now available from your iPhone and Android! Introducing [employer] As a [employer] patient, I wanted to make you aware of our electronic visit tool called Kin Florentino. Marcos Vee[employer]MakeG[employer]WithUs allows you to connect within minutes with a medical provider 24 hours a day, seven days a week via a mobile device or tablet or logging into a secure website from your computer. You can access Kin Florentino from anywhere in the [place]. A virtual visit might be right for you when you have a simple condition and feel like you just dont want to get out of bed, or cant get away from work for an appointment, when your regular [employer] provider is not available (evenings, weekends or holidays), or when youre out of town and need minor care. Electronic visits cost only $49 and if the [employer]Us provider determines a prescription is needed to treat your condition, one can be electronically transmitted to a nearby pharmacy*. Please take a moment to enroll today if you have not already done so. The enrollment process is free and takes just a few minutes. To enroll, please download the [employer]Us cortes to your tablet or phone, or visit [email or web address] to enroll on your computer.    
And, as an [address] [place] [address] patient with a [employer] account, the results of your visits will be scanned into your electronic medical record and your primary care provider will be able to view the scanned results. We urge you to continue to see your regular [facility] provider for your ongoing medical care. And while your primary care provider may not be the one available when you seek a [employer] virtual visit, the peace of mind you get from getting a real diagnosis real time can be priceless. For more information on [employer], view our Frequently Asked Questions (FAQs) at [email or web address]. Sincerely, 
 
Irina Cho MD 
Chief Medical Officer [facility] Sahra Meredith *:  certain medications cannot be prescribed via [employer] Providers Seen During Your Hospitalization Provider Specialty Primary office phone Neeru Lew MD Obstetrics & Gynecology [phone] Immunizations Administered for This Admission Name Date MMR  Deferred () Your Primary Care Physician (PCP) Primary Care Physician Office Phone Office Fax OTHER, PHYS ** None ** ** None ** You are allergic to the following No active allergies Recent Documentation Height Weight Breastfeeding? BMI OB Status Smoking Status 1.727 m 101.2 kg Unknown 33.91 kg/m2 Recent pregnancy Never Smoker Emergency Contacts Name Discharge Info Relation Home Work Mobile Krzysztof Landisfriend [17] [phone] 2021 Dipak De La O CAREGIVER [3] Parent [1] [id number] Joan Martínez  Parent [1] [phone] Jean,Eric DISCHARGE CAREGIVER [3] Boyfriend [17] [phone] Patient Belongings The following personal items are in your possession at time of discharge: 
  Dental Appliances: None  Visual Aid: None      Home Medications: None   Jewelry: None  Clothing: Undergarments, With patient, Shirt, Pants    Other Valuables: Giulia Hudson, With patient  Personal Items Sent to Safe: none Discharge Instructions Attachments/References POSTPARTUM CARE (ENGLISH) POSTPARTUM (ENGLISH) Patient Handouts Postpartum: Care Instructions Your Care Instructions After childbirth (postpartum period), your body goes through many changes. Some of these changes happen over several weeks. In the hours after delivery, your body will begin to recover from childbirth while it prepares to breastfeed your . You may feel emotional during this time. Your hormones can shift your mood without warning for no clear reason. In the first couple of weeks after childbirth, many women have emotions that change from happy to sad. You may find it hard to sleep. You may cry a lot. This is called the \"baby blues. \" These overwhelming emotions often go away within a couple of days or weeks. But it's important to discuss your feelings with your doctor. It is easy to get too tired and overwhelmed during the first weeks after childbirth. Don't try to do too much. Get rest whenever you can, accept help from others, and eat well and drink plenty of fluids. About 4 to 6 weeks after your baby's birth, you will have a follow-up visit with your doctor. This visit is your time to talk to your doctor about anything you are concerned or curious about. Follow-up care is a key part of your treatment and safety. Be sure to make and go to all appointments, and call your doctor if you are having problems. It's also a good idea to know your test results and keep a list of the medicines you take. How can you care for yourself at home? · Sleep or rest when your baby sleeps. · Get help with household chores from family or friends, if you can. Do not try to do it all yourself. · If you have hemorrhoids or swelling or pain around the opening of your vagina, try using cold and heat. You can put ice or a cold pack on the area for 10 to 20 minutes at a time. Put a thin cloth between the ice and your skin.  Also try sitting in a few inches of warm water (sitz bath) 3 times a day and after bowel movements. · Take pain medicines exactly as directed. ¨ If the doctor gave you a prescription medicine for pain, take it as prescribed. ¨ If you are not taking a prescription pain medicine, ask your doctor if you can take an over-the-counter medicine. · Eat more fiber to avoid constipation. Include foods such as whole-grain breads and cereals, raw vegetables, raw and dried fruits, and beans. · Drink plenty of fluids, enough so that your urine is light yellow or clear like water. If you have kidney, heart, or liver disease and have to limit fluids, talk with your doctor before you increase the amount of fluids you drink. · Do not rinse inside your vagina with fluids (douche). · If you have stitches, keep the area clean by pouring or spraying warm water over the area outside your vagina and anus after you use the toilet. · Keep a list of questions to bring to your postpartum visit. Your questions might be about: 
¨ Changes in your breasts, such as lumps or soreness. ¨ When to expect your menstrual period to start again. ¨ What form of birth control is best for you. ¨ Weight you have put on during the pregnancy. ¨ Exercise options. ¨ What foods and drinks are best for you, especially if you are breastfeeding. ¨ Problems you might be having with breastfeeding. ¨ When you can have sex. Some women may want to talk about lubricants for the vagina. ¨ Any feelings of sadness or restlessness that you are having. When should you call for help? Call 911 anytime you think you may need emergency care. For example, call if: 
  · You have thoughts of harming yourself, your baby, or another person.  
  · You passed out (lost consciousness).  
 Call your doctor now or seek immediate medical care if: 
  · Your vaginal bleeding seems to be getting heavier.  
  · You are dizzy or lightheaded, or you feel like you may faint.  
  · You have a fever.  Watch closely for changes in your health, and be sure to contact your doctor if: 
  · You have new or worse vaginal discharge.  
  · You feel sad or depressed.  
  · You are having problems with your breasts or breastfeeding. Where can you learn more? Go to http://aubrey-pranav.info/. Enter B377 in the search box to learn more about \"Postpartum: Care Instructions. \" Current as of: 2017 Content Version: 11.7 © 7765-1288 #waywire. Care instructions adapted under license by [employer] (which disclaims liability or warranty for this information). If you have questions about a medical condition or this instruction, always ask your healthcare professional. [address] any warranty or liability for your use of this information. After Your Delivery (the Postpartum Period): Care Instructions Your Care Instructions Congratulations on the birth of your baby. Like pregnancy, the  period can be a time of excitement, sandy, and exhaustion. You may look at your wondrous little baby and feel happy. You may also be overwhelmed by your new sleep hours and new responsibilities. At first, babies often sleep during the days and are awake at night. They do not have a pattern or routine. They may make sudden gasps, jerk themselves awake, or look like they have crossed eyes. These are all normal, and they may even make you smile. In these first weeks after delivery, try to take good care of yourself. It may take 4 to 6 weeks to feel like yourself again, and possibly longer if you had a  birth. You will likely feel very tired for several weeks. Your days will be full of ups and downs, but lots of sandy as well. Follow-up care is a key part of your treatment and safety.  Be sure to make and go to all appointments, and call your doctor if you are having problems. It's also a good idea to know your test results and keep a list of the medicines you take. How can you care for yourself at home? Take care of your body after delivery · Use pads instead of tampons for the bloody flow that may last as long as 2 weeks. · Ease cramps with ibuprofen (Advil, Motrin). · Ease soreness of hemorrhoids and the area between your vagina and rectum with ice compresses or witch hazel pads. · Ease constipation by drinking lots of fluid and eating high-fiber foods. Ask your doctor about over-the-counter stool softeners. · Cleanse yourself with a gentle squeeze of warm water from a bottle instead of wiping with toilet paper. · Take a sitz bath in warm water several times a day. · Wear a good nursing bra. Ease sore and swollen breasts with warm, wet washcloths. · If you are not breastfeeding, use ice rather than heat for breast soreness. · Your period may not start for several months if you are breastfeeding. You may bleed more, and longer at first, than you did before you got pregnant. · Wait until you are healed (about 4 to 6 weeks) before you have sexual intercourse. Your doctor will tell you when it is okay to have sex. · Try not to travel with your baby for 5 or 6 weeks. If you take a long car trip, make frequent stops to walk around and stretch. Avoid exhaustion · Rest every day. Try to nap when your baby naps. · Ask another adult to be with you for a few days after delivery. · Plan for  if you have other children. · Stay flexible so you can eat at odd hours and sleep when you need to. Both you and your baby are making new schedules. · Plan small trips to get out of the house. Change can make you feel less tired. · Ask for help with housework, cooking, and shopping. Remind yourself that your job is to care for your baby. Know about help for postpartum depression · \"Baby blues\" are common for the first 1 to 2 weeks after birth.  You may cry or feel sad or irritable for no reason. · Rest whenever you can. Being tired makes it harder to handle your emotions. · Go for walks with your baby. · Talk to your partner, friends, and family about your feelings. · If your symptoms last for more than a few weeks, or if you feel very depressed, ask your doctor for help. · Postpartum depression can be treated. Support groups and counseling can help. Sometimes medicine can also help. Stay healthy · Eat healthy foods so you have more energy, make good breast milk, and lose extra baby pounds. · If you breastfeed, avoid alcohol and drugs. If you quit smoking during pregnancy, try to stay smoke-free. · Start daily exercise after 4 to 6 weeks, but rest when you feel tired. · Learn exercises to tone your belly. Do Kegel exercises to regain strength in your pelvic muscles. You can do these exercises while you stand or sit. ¨ Squeeze the same muscles you would use to stop your urine. Your belly and thighs should not move. ¨ Hold the squeeze for 3 seconds, and then relax for 3 seconds. ¨ Start with 3 seconds. Then add 1 second each week until you are able to squeeze for 10 seconds. ¨ Repeat the exercise 10 to 15 times for each session. Do three or more sessions each day. · Find a class for new mothers and new babies that has an exercise time. · If you had a  birth, give yourself a bit more time before you exercise, and be careful. When should you call for help? Call 911 anytime you think you may need emergency care. For example, call if: 
  · You passed out (lost consciousness).  
 Call your doctor now or seek immediate medical care if: 
  · You have severe vaginal bleeding. This means you are passing blood clots and soaking through a pad each hour for 2 or more hours.  
  · You are dizzy or lightheaded, or you feel like you may faint.  
  · You have a fever.  
  · You have new belly pain, or your pain gets worse.  Watch closely for changes in your health, and be sure to contact your doctor if: 
  · Your vaginal bleeding seems to be getting heavier.  
  · You have new or worse vaginal discharge.  
  · You feel sad, anxious, or hopeless for more than a few days.  
  · You do not get better as expected. Where can you learn more? Go to http://aubrey-pranav.info/. Enter A461 in the search box to learn more about \"After Your Delivery (the Postpartum Period): Care Instructions. \" Current as of: November 21, 2017 Content Version: 11.7 © 6047-6412 [employer]. Care instructions adapted under license by [employer] (which disclaims liability or warranty for this information). If you have questions about a medical condition or this instruction, always ask your healthcare professional. [address] any warranty or liability for your use of this information. Please provide this summary of care documentation to your next provider. Signatures-by signing, you are acknowledging that this After Visit Summary has been reviewed with you and you have received a copy. Patient Signature:  ____________________________________________________________ Date:  ____________________________________________________________  
  
True Artist Provider Signature:  ____________________________________________________________ Date:  ____________________________________________________________

## 2018-07-19 NOTE — PROGRESS NOTES
Chief Complaint   Patient presents with    Routine Prenatal Visit     Pt presents in stable ocndition no complailntsl. Pt complains of [place] contractions for the last 3 days, which are painful. Pt states are not unbearable. Karthikeyan-1+, Leuk-Trace.

## 2018-07-19 NOTE — PROGRESS NOTES
Notified Dr. Dukes Pass of pt's arrival and difficulty assessing cervix,  posterior and pt not tolerating exam.  States to observe for about an hour and to recheck. 1650:  Notified Dr. Dukes Pass of pt's SROM, orders received to admit to inpatient, and for Nubain for pain and Ampicillin for GBS positive.

## 2018-07-19 NOTE — H&P
History & Physical    Name: Kathy Driver MRN: [id number]  SSN: [id number]    YOB: 1985  Age: 34 y.o. Sex: female        Subjective:     Estimated Date of Delivery: 18  OB History    Para Term  AB Living   4 1 1  1 1   SAB TAB Ectopic Molar Multiple Live Births        1      # Outcome Date GA Lbr Petar/2nd Weight Sex Delivery Anes PTL Lv   4 Current            3  08   6 lb 12 oz (3.062 kg) M Vag-Spont EPIDURAL AN  ADILENE   2 Term            1 AB                   Ms. Cyndi Gonzalez is admitted with pregnancy at 39w1d for active labor. Prenatal course was normal.  Patient was fay today in clinic. She was fingertip on check. Her contractions became increasingly frequent and painful. She ruptured clear fluid on admit. No bleeding. +FM    Of note, patient with bicornuate uterus. Fetus cephalic at last ultrasound check. Please see prenatal records for details. Past Medical History:   Diagnosis Date    Abnormal Papanicolaou smear of cervix     colpo--f/u ok     Bicornate uterus     Endometriosis      Past Surgical History:   Procedure Laterality Date    ABDOMEN SURGERY PROC UNLISTED      cyst removal    HX GYN      ovarian cysts    HX OTHER SURGICAL      ovarian cyst removed (no left ovary)      Social History     Occupational History    Not on file. Social History Main Topics    Smoking status: Never Smoker    Smokeless tobacco: Never Used    Alcohol use No      Comment: rarely    Drug use: No    Sexual activity: Yes     Partners: Male     Birth control/ protection: None     Family History   Problem Relation Age of Onset    Stroke Maternal Grandmother        No Known Allergies  Prior to Admission medications    Medication Sig Start Date End Date Taking? Authorizing Provider   ferrous sulfate 325 mg (65 mg iron) tablet Take 1 Tab by mouth Daily (before breakfast).  18  Yes Radha Lorenzana NP   PNV Combo #19-Iron-Fol Ac-DHA 22-6-1-200 mg cap Take 1 Tab by mouth daily. 1/2/18  Yes Tracy Farfan MD        Review of Systems: A comprehensive review of systems was negative except for that written in the HPI. Objective:     Vitals:  Vitals:    07/19/18 1642 07/19/18 1646   BP: 149/80    Pulse: (!) 107    Resp: 16    Temp: 98.1 °F (36.7 °C)    Weight:  223 lb (101.2 kg)   Height:  5' 8\" (1.727 m)        Physical Exam:  Patient with distress. Heart: Regular rate and rhythm  Lung: normal respiratory effort  Abdomen: soft, nontender  Perineum: blood absent, amniotic fluid present  Cervical Exam: 4 cm dilated    90% effaced    -2 station    Presenting Part: cephalic  Membranes:  Spontaneous Rupture of Membranes; Amniotic Fluid: clear fluid  Fetal Heart Rate: Reactive  Baseline: 150 per minute  Variability: moderate  Accelerations: yes  Decelerations: early decelerations with single late deceleration that resolved with position change  Uterine contractions: regular, every 2-3 minutes  Monitored for over 30 minutes    Prenatal Labs:   Lab Results   Component Value Date/Time    ABO/Rh(D) O POSITIVE 01/07/2016 08:55 PM    Rubella, External Immune 05/01/2018    GrBStrep, External positive 06/26/2018    HBsAg, External negative 06/26/2018    HIV, External non-reactive 05/01/2018    ABO,Rh O Positive 05/01/2018         Assessment/Plan:     Active Problems:    PROM (premature rupture of membranes) (7/19/2018)         Plan: Admit for Labor  Progressing normally. Group B Strep was positive, will treat prophylactically with ampicillin.  nubain or epidural for pain control    Signed By:  Ziggy Reyna MD     July 19, 2018

## 2018-07-19 NOTE — ANESTHESIA PREPROCEDURE EVALUATION
Anesthetic History   No history of anesthetic complications            Review of Systems / Medical History  Patient summary reviewed, nursing notes reviewed and pertinent labs reviewed    Pulmonary  Within defined limits                 Neuro/Psych   Within defined limits           Cardiovascular  Within defined limits                     GI/Hepatic/Renal  Within defined limits              Endo/Other        Obesity     Other Findings              Physical Exam    Airway  Mallampati: II  TM Distance: > 6 cm  Neck ROM: normal range of motion   Mouth opening: Normal     Cardiovascular  Regular rate and rhythm,  S1 and S2 normal,  no murmur, click, rub, or gallop             Dental  No notable dental hx       Pulmonary  Breath sounds clear to auscultation               Abdominal  GI exam deferred       Other Findings            Anesthetic Plan    ASA: 2  Anesthesia type: epidural          Induction: Intravenous  Anesthetic plan and risks discussed with: Patient

## 2018-07-19 NOTE — MR AVS SNAPSHOT
[facility] [address] 
[phone] Patient: Sanjay Conway MRN: [id number] IJI:[id number] Visit Information Date & Time Provider Department Dept. Phone Encounter #  
 7/19/2018  1:30 PM Albertina Duckworth NP [facility] [address] [facility] AT [address] [id number] Follow-up Instructions Return in about 5 days (around 7/24/2018) for TRACI. Upcoming Health Maintenance Date Due  
 OB 3RD TRIMESTER TDAP 4/25/2018 Influenza Age 5 to Adult 8/1/2018 PAP AKA CERVICAL CYTOLOGY 1/2/2021 Allergies as of 7/19/2018  Review Complete On: 7/19/2018 By: Albertina Duckworth NP No Known Allergies Current Immunizations  Never Reviewed No immunizations on file. Not reviewed this visit You Were Diagnosed With   
  
 Codes Comments Proteinuria affecting pregnancy in third trimester    -  Primary ICD-10-CM: O12.13 ICD-9-CM: 646.23, 791.0 Vitals BP Pulse Height(growth percentile) Weight(growth percentile) LMP BMI  
 120/78 (BP 1 Location: Left arm, BP Patient Position: Sitting) 92 5' 8\" (1.727 m) 223 lb 9.6 oz (101.4 kg) 10/18/2017 (Approximate) 34 kg/m2 OB Status Smoking Status Pregnant Never Smoker Vitals History BMI and BSA Data Body Mass Index Body Surface Area 34 kg/m 2 2.21 m 2 Preferred [facility] [address] [facility] Ludmila Constantino  [place] [phone] Your Updated Medication List  
  
   
This list is accurate as of 7/19/18  2:13 PM.  Always use your most recent med list.  
  
  
  
  
 ferrous sulfate 325 mg (65 mg iron) tablet Take 1 Tab by mouth Daily (before breakfast). PNV Combo #19-Iron-Fol Ac-DHA 22-6-1-200 mg Cap Take 1 Tab by mouth daily. We Performed the Following CULTURE, URINE [id number] CPT(R)] Follow-up Instructions Return in about 5 days (around 2018) for TRACI. Patient Instructions Labor Induction: Care Instructions Your Care Instructions If you pass your due date and your labor does not start on its own, your doctor may want to try to start (induce) labor. Your doctor may suggest doing this for other reasons. It may be a good idea to induce labor if you have another problem. For example, it may be done if you have high blood pressure. Or it may be a good idea if the placenta can no longer give enough support to the baby. There are several ways to induce labor. · Medicine may be used to make the cervix soft and help it thin. · Medicine may be used to cause the uterus to contract. · A balloon catheter may be used to help the cervix open. · Your doctor may sweep the membranes or break the amniotic sac to start or increase labor. This may be done if your cervix is soft and slightly open. You may need to have your baby delivered through a cut (incision) in your belly. This is called a  section, or a . It may be done if your doctor has used medicine but your labor is not progressing. After you have your baby, you should not have any side effects from the medicine used to start labor. Follow-up care is a key part of your treatment and safety. Be sure to make and go to all appointments, and call your doctor if you are having problems. It's also a good idea to know your test results and keep a list of the medicines you take. When should your labor be induced? · Your pregnancy has gone 1 to 2 weeks past your expected due date. · You have a problem that may harm your health or the health of your baby if you continue to be pregnant. This includes high blood pressure, preeclampsia, and diabetes. · Your water breaks, but labor does not start. When should you call for help?  
Watch closely for changes in your health, and be sure to contact your doctor if you have questions about inducing labor. Where can you learn more? Go to http://aubrey-pranav.info/. Enter T378 in the search box to learn more about \"Labor Induction: Care Instructions. \" Current as of: November 21, 2017 Content Version: 11.7 © 7810-8353 [employer]. Care instructions adapted under license by [employer] (which disclaims liability or warranty for this information). If you have questions about a medical condition or this instruction, always ask your healthcare professional. [address] any warranty or liability for your use of this information. Introducing [facility]! Julio César Mites[employer] introduces Inbilin patient portal. Now you can access parts of your medical record, email your doctor's office, and request medication refills online. 1. In your internet browser, go to https://[email or web address] 2. Click on the First Time User? Click Here link in the Sign In box. You will see the New Member Sign Up page. 3. Enter your Inbilin Access Code exactly as it appears below. You will not need to use this code after youve completed the sign-up process. If you do not sign up before the expiration date, you must request a new code. · Inbilin Access Code: [email or web address] Expires: 10/1/2018  2:08 PM 
 
4. Enter the last four digits of your Social Security Number (xxxx) and Date of Birth (mm/dd/yyyy) as indicated and click Submit. You will be taken to the next sign-up page. 5. Create a Inbilin ID. This will be your Inbilin login ID and cannot be changed, so think of one that is secure and easy to remember. 6. Create a Inbilin password. You can change your password at any time. 7. Enter your Password Reset Question and Answer. This can be used at a later time if you forget your password. 8. Enter your e-mail address. You will receive e-mail notification when new information is available in [address] 9. Click Sign Up. You can now view and download portions of your medical record. 10. Click the Download Summary menu link to download a portable copy of your medical information. If you have questions, please visit the Frequently Asked Questions section of the [employer] website. Remember, Lightningcast is NOT to be used for urgent needs. For medical emergencies, dial 911. Now available from your iPhone and Android! Please provide this summary of care documentation to your next provider. Your primary care clinician is listed as Phys Other. If you have any questions after today's visit, please call [phone].

## 2018-07-19 NOTE — PROGRESS NOTES
G 3 P 1 rec'd to LR # 6 via wheelchair to the s/o Dr Ovidio Dial at 39.1 weeks gest with c/o ctxs since ~ 1430. Denies ROMs and VB. States +FM. GBS: positive. NKDA.

## 2018-07-19 NOTE — TELEPHONE ENCOUNTER
Patient called back stating contractions are now 5 mins apart for about an hour, she has not noticed and bleeding or fluid leaking. Spoke with Dr. Evangelina Lyn and he stated to for patient to go to [facility] and check into L&D for further evaluation. Patient verbalized understanding.

## 2018-07-19 NOTE — PROGRESS NOTES
Return OB Visit    Pt doing well. States good FM, no LOF, no VB. Visit Vitals    /78 (BP 1 Location: Left arm, BP Patient Position: Sitting)    Pulse 92    Ht 5' 8\" (1.727 m)    Wt 223 lb 9.6 oz (101.4 kg)    LMP 10/18/2017 (Approximate)    BMI 34 kg/m2      See exam on prenatal record/reviewed     Plan routine OB care        ICD-10-CM ICD-9-CM    1. Proteinuria affecting pregnancy in third trimester O12.13 646.23 CULTURE, URINE     791.0       Plan:  Consider induction later next week with Dr. Minerva Liz. Cx. Unchanged. Reports infrequent uterine contractions- just monitor. RTO 1 week. All questions addressed. Pt. Voices understanding of treatment plan. Follow-up Disposition:  Return in about 5 days (around 7/24/2018) for Angelito Juárez 9038.       Meri Perez RN, [facility]

## 2018-07-19 NOTE — PROGRESS NOTES
[place] [address] Dr. Analy Garay at bedside. Reviewed tracing. SVE 4-5//90/-2.     1730 Patient requesting epidural. IV bolus started.  Patient states \"I need to push\". SVE 10/100//+2. MD called to bedside. 46 Live male delivered via  by Dr. Hipolito Christensen. Taken to warmer for stimulation and evaluation. NICU called to room.  Bedside and Verbal shift change report given to PRATIBHA Olsen  (oncoming nurse) by ELENA Hurley RN (offgoing nurse). Report included the following information SBAR, Kardex, Procedure Summary, Intake/Output, MAR and Recent Results.

## 2018-07-19 NOTE — TELEPHONE ENCOUNTER
Patient called stating that she is \" cramping\" more and wanted to know if she should go to the hospital or come back to the office. Spoke with NP and she stated that patient should time the contractions and if they are 5- 7 mins for 1 hour or she starts bleeding or leaking fluids to call the office back. Patient verbalized understanding.

## 2018-07-19 NOTE — PATIENT INSTRUCTIONS
Labor Induction: Care Instructions  Your Care Instructions  If you pass your due date and your labor does not start on its own, your doctor may want to try to start (induce) labor. Your doctor may suggest doing this for other reasons. It may be a good idea to induce labor if you have another problem. For example, it may be done if you have high blood pressure. Or it may be a good idea if the placenta can no longer give enough support to the baby. There are several ways to induce labor. · Medicine may be used to make the cervix soft and help it thin. · Medicine may be used to cause the uterus to contract. · A balloon catheter may be used to help the cervix open. · Your doctor may sweep the membranes or break the amniotic sac to start or increase labor. This may be done if your cervix is soft and slightly open. You may need to have your baby delivered through a cut (incision) in your belly. This is called a  section, or a . It may be done if your doctor has used medicine but your labor is not progressing. After you have your baby, you should not have any side effects from the medicine used to start labor. Follow-up care is a key part of your treatment and safety. Be sure to make and go to all appointments, and call your doctor if you are having problems. It's also a good idea to know your test results and keep a list of the medicines you take. When should your labor be induced? · Your pregnancy has gone 1 to 2 weeks past your expected due date. · You have a problem that may harm your health or the health of your baby if you continue to be pregnant. This includes high blood pressure, preeclampsia, and diabetes. · Your water breaks, but labor does not start. When should you call for help? Watch closely for changes in your health, and be sure to contact your doctor if you have questions about inducing labor. Where can you learn more?   Go to http://aubrey-pranav.info/. Enter Y853 in the search box to learn more about \"Labor Induction: Care Instructions. \"  Current as of: November 21, 2017  Content Version: 11.7  © 2941-3111 [employer], [facility]. Care instructions adapted under license by [employer] (which disclaims liability or warranty for this information). If you have questions about a medical condition or this instruction, always ask your healthcare professional. [place] [address] any warranty or liability for your use of this information.

## 2018-07-19 NOTE — PROGRESS NOTES
Bedside, Verbal and Written shift change report given to PRATIBHA Boothe (oncoming nurse) by ELENA Barclay RN (offgoing nurse). Report included the following information SBAR, MAR and Recent Results. 2024: TRANSFER - OUT REPORT:    Verbal report given to DEVONTE Mercado RN(name) on [facility]  being transferred to mother infant unit (unit) for routine progression of care       Report consisted of patients Situation, Background, Assessment and   Recommendations(SBAR). Information from the following report(s) SBAR, MAR and Recent Results was reviewed with the receiving nurse. Lines:   Peripheral IV 07/19/18 Left Hand (Active)   Site Assessment Clean, dry, & intact 7/19/2018  5:02 PM   Phlebitis Assessment 0 7/19/2018  5:02 PM   Infiltration Assessment 0 7/19/2018  5:02 PM   Dressing Status Clean, dry, & intact 7/19/2018  5:02 PM   Dressing Type Transparent 7/19/2018  5:02 PM   Hub Color/Line Status Pink; Infusing 7/19/2018  5:02 PM   Action Taken Blood drawn 7/19/2018  5:02 PM   Alcohol Cap Used Yes 7/19/2018  5:02 PM        Opportunity for questions and clarification was provided.       Patient transported with:   Registered Nurse

## 2018-07-20 PROCEDURE — 94760 N-INVAS EAR/PLS OXIMETRY 1: CPT

## 2018-07-20 PROCEDURE — 74011250637 HC RX REV CODE- 250/637: Performed by: OBSTETRICS & GYNECOLOGY

## 2018-07-20 PROCEDURE — 65410000002 HC RM PRIVATE OB

## 2018-07-20 RX ORDER — IBUPROFEN 600 MG/1
600 TABLET ORAL EVERY 8 HOURS
Qty: 100 TAB | Refills: 1 | Status: SHIPPED | OUTPATIENT
Start: 2018-07-20

## 2018-07-20 RX ADMIN — IBUPROFEN 800 MG: 400 TABLET ORAL at 05:29

## 2018-07-20 RX ADMIN — MUPIROCIN: 20 OINTMENT TOPICAL at 15:33

## 2018-07-20 RX ADMIN — IBUPROFEN 800 MG: 400 TABLET ORAL at 14:14

## 2018-07-20 NOTE — ROUTINE PROCESS
Bedside shift change report given to NINI Giang (oncoming nurse) by Price Kohler. Elmer Jackson RN (offgoing nurse). Report included the following information SBAR, Kardex, Intake/Output, MAR, Accordion and Recent Results.

## 2018-07-20 NOTE — L&D DELIVERY NOTE
Delivery Note    Obstetrician:  Ector Barlow MD    Assistant: none    Pre-Delivery Diagnosis: Term pregnancy with Spontaneous labor    Post-Delivery Diagnosis: Living  infant(s), Male--NYEEM                                              Meconium fluid with meconium stained placenta and membranes    Intrapartum Event: Precipitous labor (less than 3 hours)    Procedure: Spontaneous vaginal delivery    Epidural: NO    Monitor:  Fetal Heart Tones - External and Uterine Contractions - External    Indications for instrumental delivery: none    Estimated Blood Loss: 300    Episiotomy: none    Laceration(s):  2nd degree    Laceration(s) repair: YES    Presentation: Cephalic    Fetal Description: alvarenga    Fetal Position: Occiput Anterior    Birth Weight: 3355 g    Birth Length: 54.6 cm    Apgar - One Minute: 1    Apgar - Five Minutes: 6     Apgar - Ten Minutes:  9    Umbilical Cord: Nuchal Cord x  1, 3 vessels present and Cord blood sent to lab for type, Rh, and Ligia' test    Specimens: none           Complications:  none           Cord Blood Results:   Information for the patient's :  Jaqui Shea [[id number]]     Lab Results   Component Value Date/Time    LEAH IgG NEG 2018 06:17 PM    Bilirubin if LEAH pos: IF DIRECT LIGIA POSITIVE, BILIRUBIN TO FOLLOW 2018 06:17 PM    ABO/Rh(D) Levon Harvey POSITIVE 2018 06:17 PM     Prenatal Labs:     Lab Results   Component Value Date/Time    ABO/Rh(D) Levon Harvey POSITIVE 2016 08:55 PM    ABO,Rh O Positive 2018    HBsAg, External negative 2018    HBsAg, External negative 2018    HIV, External non-reactive 2018    Rubella, External Immune 2018    Gonorrhea, External negative 06/10/2018    Chlamydia, External negative 06/10/2018    GrBStrep, External positive 2018        Attending Attestation: I performed the procedure.      Signed By:  Ector Barlow MD     2018          Placenta delivered easily and intact after delivery of infant. It was then discarded.

## 2018-07-20 NOTE — DISCHARGE SUMMARY
Obstetrical Discharge Summary     Name: Mady Oshea MRN: [id number]  SSN: [id number]    YOB: 1985  Age: 34 y.o. Sex: female      Allergies: Review of patient's allergies indicates no known allergies. Admit Date: 2018    Discharge Date: 2018    Admitting Physician: Alfonso Pimentel MD     Attending Physician:  Alfonso Pimentel MD     * Admission Diagnoses: PROM (premature rupture of membranes)    * Discharge Diagnoses:   Information for the patient's :  Irma Dodson [[id number]]   Delivery of a 7 lb 6.3 oz (3.355 kg) male infant via Vaginal, Spontaneous Delivery on 2018 at 6:02 PM  by . Apgars were 1 and 6. Additional Diagnoses:   Hospital Problems as of 2018  Date Reviewed: 2018          Codes Class Noted - Resolved POA    PROM (premature rupture of membranes) ICD-10-CM: O42.90  ICD-9-CM: 658.10  2018 - Present Unknown             Lab Results   Component Value Date/Time    ABO/Rh(D) O POSITIVE 2016 08:55 PM    Rubella, External Immune 2018    GrBStrep, External positive 2018    ABO,Rh O Positive 2018    There is no immunization history for the selected administration types on file for this patient. * Procedures: vaginal delivery  * No surgery found *           * Discharge Condition: good    [facility] Course: Normal hospital course following the delivery. * Disposition: Home    Discharge Medications:   Current Discharge Medication List          * Follow-up Care/Patient Instructions:   Activity: Activity as tolerated and No sex for 6 weeks  Diet: Regular Diet  Wound Care: None needed    Follow-up Information     None           Signed By:  Alfonso Pimentel MD     2018

## 2018-07-20 NOTE — PROGRESS NOTES
Post-Partum Day Number 1 Progress Note    Patient doing well post-partum without significant complaint. Voiding withour difficulty, normal lochia. Vitals:  Patient Vitals for the past 8 hrs:   BP Temp Pulse Resp   18 0530 108/67 98 °F (36.7 °C) 81 14   18 0010 113/70 97.8 °F (36.6 °C) 71 14     Temp (24hrs), Av.1 °F (36.7 °C), Min:97.8 °F (36.6 °C), Max:98.2 °F (36.8 °C)      Vital signs stable, afebrile. Exam:  Patient without distress. Abdomen soft, fundus firm at level of umbilicus, nontender               Perineum with normal lochia noted. Lower extremities are negative for swelling, cords or tenderness. Lab/Data Review: All lab results for the last 24 hours reviewed. Assessment and Plan:  Patient appears to be having uncomplicated post-partum course. Continue routine perineal care and maternal education. Plan discharge tomorrow if no problems occur.

## 2018-07-20 NOTE — PROGRESS NOTES
NUTRITION       Nutrition screening referral was triggered based on results obtained during nursing admission assessment. The patient's chart was reviewed and nutrition assessment is not indicated at this time. Plan to see patient for rescreen as indicated. Thank you.      [address]

## 2018-07-20 NOTE — ROUTINE PROCESS
Bedside and Verbal shift change report given to Luis Lo RN (oncoming nurse) by Batool Virgen. Laura Muñoz RN (offgoing nurse). Report included the following information SBAR, Kardex, Procedure Summary, Intake/Output, MAR and Recent Results.

## 2018-07-21 VITALS
SYSTOLIC BLOOD PRESSURE: 116 MMHG | TEMPERATURE: 97.9 F | DIASTOLIC BLOOD PRESSURE: 77 MMHG | OXYGEN SATURATION: 99 % | HEART RATE: 86 BPM | RESPIRATION RATE: 16 BRPM | WEIGHT: 223 LBS | BODY MASS INDEX: 33.8 KG/M2 | HEIGHT: 68 IN

## 2018-07-21 LAB — BACTERIA UR CULT: NORMAL

## 2018-07-21 PROCEDURE — 74011250637 HC RX REV CODE- 250/637: Performed by: OBSTETRICS & GYNECOLOGY

## 2018-07-21 RX ADMIN — IBUPROFEN 800 MG: 400 TABLET ORAL at 02:05

## 2018-07-21 RX ADMIN — IBUPROFEN 800 MG: 400 TABLET ORAL at 09:34

## 2018-07-21 NOTE — DISCHARGE INSTRUCTIONS
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 It is easy to get too tired and overwhelmed during the first weeks after your baby is born. Changes in your hormones can shift your mood without warning. You may find it hard to meet the extra demands on your energy and time. Take it easy on yourself. Follow-up care is a key part of your treatment and safety. Be sure to make and go to all appointments, and call your doctor if you are having problems. It's also a good idea to know your test results and keep a list of the medicines you take. How can you care for yourself at home? Vaginal Bleeding and Cramps  · After delivery, you will have a bloody discharge from the vagina. This will turn pink within a week and then white or yellow after about 10 days. It may last for 2 to 4 weeks or longer, until the uterus has healed. Use pads instead of tampons until you stop bleeding. · Do not worry if you pass some blood clots, as long as they are smaller than a golf ball.  If you have a tear or stitches in your vaginal area, change the pad at least every 4 hours to prevent soreness and infection. · You may have cramps for the first few days after childbirth. These are normal and occur as the uterus shrinks to normal size. Take an over-the-counter pain medicine, such as acetaminophen (Tylenol), ibuprofen (Advil, Motrin), or naproxen (Aleve), for cramps. Read and follow all instructions on the label. Do not take aspirin, because it can cause more bleeding. Do not take acetaminophen (Tylenol) and other acetaminophen containing medications (i.e. Percocet) at the same time. Episiotomy, Lacerations or Tears  · If you have stitches, they will dissolve on their own and do not need to be removed. · Put ice or a cold pack on your painful area for 10 to 20 minutes at a time, several times a day, for the first few days. Put a thin cloth between the ice and your skin. · Sit in a few inches of warm water (sitz bath) 3 times a day and after bowel movements. The warm water helps with pain and itching. If you do not have a tub, a warm shower might help. Breast fullness  · Your breasts may overfill (engorge) in the first few days after delivery. To help milk flow and to relieve pain, warm your breasts in the shower or by using warm, moist towels before nursing. · If you are not nursing, do not put warmth on your breasts or touch your breasts. Wear a tight bra or sports bra and use ice until the fullness goes away. This usually takes 2 to 3 days. · Put ice or a cold pack on your breast after nursing to reduce swelling and pain. Put a thin cloth between the ice and your skin. Activity  · Eat a balanced diet. Do not try to lose weight by cutting calories. Keep taking your prenatal vitamins, or take a multivitamin. · Get as much rest as you can. Try to take naps when your baby sleeps during the day. · Get some exercise every day. But do not do any heavy exercise until your doctor says it is okay.   · Wait until you are healed (about 4 to 6 weeks) before you have sexual intercourse. Your doctor will tell you when it is okay to have sex. · Talk to your doctor about birth control. You can get pregnant even before your period returns. Also, you can get pregnant while you are breast-feeding. Mental Health  · Many women get the \"baby blues\" during the first few days after childbirth. You may lose sleep, feel irritable, and cry easily. You may feel happy one minute and sad the next. Hormone changes are one cause of these emotional changes. Also, the demands of a new baby, along with visits from relatives or other family needs, add to a mother's stress. The \"baby blues\" often peak around the fourth day. Then they ease up in less than 2 weeks. · If your moodiness or anxiety lasts for more than 2 weeks, or if you feel like life is not worth living, you may have postpartum depression. This is different for each mother. Some mothers with serious depression may worry intensely about their infant's well-being. Others may feel distant from their child. Some mothers might even feel that they might harm their baby. A mother may have signs of paranoia, wondering if someone is watching her. · With all the changes in your life, you may not know if you are depressed. Pregnancy sometimes causes changes in how you feel that are similar to the symptoms of depression. · Symptoms of depression include:  · Feeling sad or hopeless and losing interest in daily activities. These are the most common symptoms of depression. · Sleeping too much or not enough. · Feeling tired. You may feel as if you have no energy. · Eating too much or too little. · POSTPARTUM [employer]NATIONAL (PSI) offers a Warm line; Chat with the Expert phone sessions; Information and Articles about Pregnancy and Postpartum Mood Disorders;  Comprehensive List of Free Support Groups; Knowledgeable local coordinators who will offer support, information, and resources; Guide to Resources on [employer]; Calendar of events in the  mood disorders community; Latest News and Research; and [facility] [address] for [employer]. Remember - You are not alone; You are not to blame; With help, you will be well. 9-208-069-PPD(0814). WWW. POSTPARTUM. NET    · Writing or talking about death, such as writing suicide notes or talking about guns, knives, or pills. Keep the numbers for these national suicide hotlines: 5-035-106-TALK (3-573.800.8280) and 6-242-PXUOZKT (9-146.148.2480). If you or someone you know talks about suicide or feeling hopeless, get help right away. Constipation and Hemorrhoids  Drink plenty of fluids, enough so that your urine is light yellow or clear like water. If you have kidney, heart, or liver disease and have to limit fluids, talk with your doctor before you increase the amount of fluids you drink. · Eat plenty of fiber each day. Have a bran muffin or bran cereal for breakfast, and try eating a piece of fruit for a mid-afternoon snack. · For painful, itchy hemorrhoids, put ice or a cold pack on the area several times a day for 10 minutes at a time. Follow this by putting a warm compress on the area for another 10 to 20 minutes or by sitting in a shallow, warm bath. When should you call for help? Call 911 anytime you think you may need emergency care. For example, call if:  · You are thinking of hurting yourself, your baby, or anyone else. · You passed out (lost consciousness). · You have symptoms of a blood clot in your lung (called a pulmonary embolism). These may include:  · Sudden chest pain. · Trouble breathing. · Coughing up blood. Call your doctor now or seek immediate medical care if:  · You have severe vaginal bleeding. · You are soaking through a pad each hour for 2 or more hours. · Your vaginal bleeding seems to be getting heavier or is still bright red 4 days after delivery. · You are dizzy or lightheaded, or you feel like you may faint.   · You are vomiting or cannot keep fluids down. · You have a fever. · You have new or more belly pain. · You pass tissue (not just blood). · Your vaginal discharge smells bad. · Your belly feels tender or full and hard. · Your breasts are continuously painful or red. · You feel sad, anxious, or hopeless for more than a few days. · You have sudden, severe pain in your belly. · You have symptoms of a blood clot in your leg (called a deep vein thrombosis), such as:  · Pain in your calf, back of the knee, thigh, or groin. · Redness and swelling in your leg or groin. · You have symptoms of preeclampsia, such as:  · Sudden swelling of your face, hands, or feet. · New vision problems (such as dimness or blurring). · A severe headache. · Your blood pressure is higher than it should be or rises suddenly. · You have new nausea or vomiting. Watch closely for changes in your health, and be sure to contact your doctor if you have any problems. Additional Information:  Not applicable    These are general instructions for a healthy lifestyle:    No smoking/ No tobacco products/ Avoid exposure to second hand smoke    Surgeon General's Warning:  Quitting smoking now greatly reduces serious risk to your health. Obesity, smoking, and sedentary lifestyle greatly increases your risk for illness    A healthy diet, regular physical exercise & weight monitoring are important for maintaining a healthy lifestyle    Recognize signs and symptoms of STROKE:    F-face looks uneven    A-arms unable to move or move unevenly    S-speech slurred or non-existent    T-time-call 911 as soon as signs and symptoms begin - DO NOT go       back to bed or wait to see if you get better - TIME IS BRAIN. I have had the opportunity to make my options or choices for discharge. I have received and understand these instructions.

## 2018-07-21 NOTE — PROGRESS NOTES
Post-Partum Day Number 2 Progress Note    Ericpo Martínez     Assessment: Doing well, post partum day 2    Plan:   1. Discharge home today  2. Follow up in office in 6 weeks with Patsy Smallwood MD  3. Post partum activity advised, diet as tolerated  4. Discharge Medications: ibuprofen, percocet and medications prior to admission    Information for the patient's :  Alexandra Preston [[id number]]   Vaginal, Spontaneous Delivery   Patient doing well without significant complaint. Voiding without difficulty, normal lochia. Vitals:  Visit Vitals    /77 (BP 1 Location: Right arm, BP Patient Position: At rest)    Pulse 86    Temp 97.9 °F (36.6 °C)    Resp 16    Ht 5' 8\" (1.727 m)    Wt 223 lb (101.2 kg)    LMP 10/18/2017 (Approximate)    SpO2 99%    Breastfeeding No    BMI 33.91 kg/m2     Temp (24hrs), Av.2 °F (36.8 °C), Min:97.9 °F (36.6 °C), Max:98.7 °F (37.1 °C)      Exam:         Patient without distress. Abdomen soft, fundus firm, nontender                 Lower extremities are negative for swelling, cords or tenderness.     Labs:     Lab Results   Component Value Date/Time    WBC 8.6 2018 05:05 PM    WBC 8.5 2018 12:00 PM    WBC 7.2 2018 03:32 PM    WBC 6.7 06/10/2016 10:19 AM    WBC 6.9 2016 04:25 PM    WBC 8.7 2016 08:55 PM    HGB 10.6 (L) 2018 05:05 PM    HGB 9.7 (L) 2018 12:00 PM    HGB 12.1 2018 03:32 PM    HGB 12.5 06/10/2016 10:19 AM    HGB 13.1 2016 04:25 PM    HGB 13.0 2016 08:55 PM    HCT 34.3 (L) 2018 05:05 PM    HCT 30.5 (L) 2018 12:00 PM    HCT 37.4 2018 03:32 PM    HCT 39.2 06/10/2016 10:19 AM    HCT 40.6 2016 04:25 PM    HCT 39.5 2016 08:55 PM    PLATELET 328 (L) 15/15/9946 05:05 PM    PLATELET 776  12:00 PM    PLATELET 080  03:32 PM    PLATELET 475 34/15/1256 10:19 AM    PLATELET 697  04:25 PM    PLATELET 029  08:55 PM    Hgb, External 9.7 06/26/2018    Hgb, External 9.7 05/01/2018    Hct, External 30.5 06/26/2018    Hct, External 30.5 05/01/2018    Platelet cnt., External 190 06/26/2018    Platelet cnt., External 190 05/01/2018       No results found for this or any previous visit (from the past 24 hour(s)).

## 2018-07-21 NOTE — ROUTINE PROCESS
Bedside and Verbal shift change report given to SAMUEL Colón (oncoming nurse) by Chilo Finn RN (offgoing nurse). Report included the following information SBAR.

## 2018-07-21 NOTE — LACTATION NOTE
This note was copied from a baby's chart. Mother and infant for discharge today. Mother had decided overnight to exclusively pump. She is using Marney Neal Dhaliwal's cream but says her nipples are too sore to put infant to breast.  Mother is giving formula for now. Mother does not have a pump at home. I offered to rent her a [employer] pump or give her a hand pump and she chose the hand pump. Mother was advised to pump q 3 hours. She is going to obtain a pump early next week. All questions answered and teaching complete.

## 2018-07-21 NOTE — PROGRESS NOTES
Bedside and Verbal shift change report given to Richi Ramsey (oncoming nurse) by ELENA Sadler RN (offgoing nurse). Report included the following information SBAR.

## 2018-07-21 NOTE — DISCHARGE SUMMARY
Obstetrical Discharge Summary     Name: Connie Riddle MRN: [id number]  SSN: [id number]    YOB: 1985  Age: 34 y.o. Sex: female      Admit Date: 2018    Discharge Date: 2018     Admitting Physician: Baljit Pope MD     Attending Physician:  Baljit Pope MD     Admission Diagnoses: PROM (premature rupture of membranes)    Discharge Diagnoses:   Information for the patient's :  Farshad Duran [[id number]]   Delivery of a 7 lb 6.3 oz (3.355 kg) male infant via Vaginal, Spontaneous Delivery on 2018 at 6:02 PM  by . Apgars were 1 and 6. Additional Diagnoses:   Hospital Problems  Date Reviewed: 2018          Codes Class Noted POA    PROM (premature rupture of membranes) ICD-10-CM: O42.90  ICD-9-CM: 658.10  2018 Unknown             Lab Results   Component Value Date/Time    Rubella, External Immune 2018    GrBStrep, External positive 2018       Hospital Course: Normal hospital course following the delivery. Disposition at Discharge: Home or self care    Discharged Condition: Stable    Patient Instructions:   Current Discharge Medication List      START taking these medications    Details   ibuprofen (MOTRIN) 600 mg tablet Take 1 Tab by mouth every eight (8) hours. Qty: 100 Tab, Refills: 1         CONTINUE these medications which have NOT CHANGED    Details   ferrous sulfate 325 mg (65 mg iron) tablet Take 1 Tab by mouth Daily (before breakfast). Qty: 30 Tab, Refills: 3      PNV Combo #19-Iron-Fol Ac-DHA 22-6-1-200 mg cap Take 1 Tab by mouth daily. Qty: 90 Cap, Refills: 4             Reference my discharge instructions.     Follow-up Appointments   Procedures    FOLLOW UP VISIT Appointment in: 6 Weeks     Standing Status:   Standing     Number of Occurrences:   1     Order Specific Question:   Appointment in     Answer:   6 Weeks        Signed By:  Patel Frausto MD     2018

## 2018-09-19 ENCOUNTER — OFFICE VISIT (OUTPATIENT)
Dept: OBGYN CLINIC | Age: 33
End: 2018-09-19

## 2018-09-19 VITALS
HEIGHT: 68 IN | SYSTOLIC BLOOD PRESSURE: 125 MMHG | WEIGHT: 194.4 LBS | RESPIRATION RATE: 18 BRPM | HEART RATE: 76 BPM | BODY MASS INDEX: 29.46 KG/M2 | DIASTOLIC BLOOD PRESSURE: 90 MMHG | TEMPERATURE: 97.5 F

## 2018-09-19 RX ORDER — ACETAMINOPHEN AND CODEINE PHOSPHATE 120; 12 MG/5ML; MG/5ML
1 SOLUTION ORAL DAILY
Qty: 1 PACKAGE | Refills: 12 | Status: SHIPPED | OUTPATIENT
Start: 2018-09-19

## 2018-09-19 NOTE — PROGRESS NOTES
Subjective:   Patient is a 23 y.o. who is now 7 weeks postpartum. OB History    Grav Para Term  Abortions TAB SAB Ect Mult Living    1 1 1 0 0 0 0 0 1 2        Method of delivery: normal spontaneous vaginal delivery  She is breast-feeding and is not experiencing problems. Pregnancy complications: none. She is feeling happy. She currently uses no method for contraception. She plans to use oral progesterone-only contraceptive for contraception. Objective:   Physical Exam:  Date of last Pap smear:     Physical Exam:   Vitals:    18 1107 18 1118   BP: 126/90 125/90   Pulse: 80 76   Resp: 18    Temp: 97.5 °F (36.4 °C)    TempSrc: Oral    Weight: 194 lb 6.4 oz (88.2 kg)    Height: 5' 8\" (1.727 m)        General: NAD  Breast:  deferred  Abdomen:  Soft/ NT/ perineum well healed . Ext: no edema    Assessment/Plan:   normal postpartum exam  See orders and Patient Instructions  Resume all normal activities   current treatment plan is effective, no change in therapy  Rx. For Micronor#1, 12 refill given,  RTO 1 year.      Follow-up Disposition: Not on [facility]

## 2018-09-19 NOTE — PROGRESS NOTES
Chief Complaint   Patient presents with   [address] over the last two weeks 9/19/2018   Little interest or pleasure in doing things Not at all   Feeling down, depressed, irritable, or hopeless Not at all   Total Score PHQ 2 0

## 2022-03-18 PROBLEM — O42.90 PROM (PREMATURE RUPTURE OF MEMBRANES): Status: ACTIVE | Noted: 2018-07-19

## 2022-03-19 PROBLEM — Z34.90 PREGNANT: Status: ACTIVE | Noted: 2018-01-02

## 2025-03-19 ENCOUNTER — HOSPITAL ENCOUNTER (EMERGENCY)
Facility: HOSPITAL | Age: 40
Discharge: HOME | End: 2025-03-19
Payer: COMMERCIAL

## 2025-03-19 VITALS
BODY MASS INDEX: 29.34 KG/M2 | OXYGEN SATURATION: 97 % | RESPIRATION RATE: 20 BRPM | DIASTOLIC BLOOD PRESSURE: 98 MMHG | TEMPERATURE: 99.7 F | WEIGHT: 186.95 LBS | SYSTOLIC BLOOD PRESSURE: 145 MMHG | HEIGHT: 67 IN | HEART RATE: 109 BPM

## 2025-03-19 DIAGNOSIS — J03.00 ACUTE STREPTOCOCCAL TONSILLITIS, NOT SPECIFIED AS RECURRENT OR NOT: Primary | ICD-10-CM

## 2025-03-19 LAB
FLUAV RNA SPEC QL NAA+PROBE: NOT DETECTED
FLUBV RNA SPEC QL NAA+PROBE: NOT DETECTED
S PYO DNA THROAT QL NAA+PROBE: DETECTED
SARS-COV-2 RNA RESP QL NAA+PROBE: NOT DETECTED
SOURCE: NORMAL

## 2025-03-19 PROCEDURE — 99283 EMERGENCY DEPT VISIT LOW MDM: CPT

## 2025-03-19 PROCEDURE — 87651 STREP A DNA AMP PROBE: CPT

## 2025-03-19 PROCEDURE — 87636 SARSCOV2 & INF A&B AMP PRB: CPT

## 2025-03-19 RX ORDER — AMOXICILLIN 500 MG/1
500 CAPSULE ORAL 2 TIMES DAILY
Qty: 20 CAPSULE | Refills: 0 | Status: SHIPPED | OUTPATIENT
Start: 2025-03-19 | End: 2025-03-29

## 2025-03-19 RX ORDER — IBUPROFEN 600 MG/1
600 TABLET, FILM COATED ORAL EVERY 8 HOURS PRN
Qty: 20 TABLET | Refills: 0 | Status: SHIPPED | OUTPATIENT
Start: 2025-03-19

## 2025-03-19 RX ORDER — GLYCERIN 0.25 %
1 DROPS OPHTHALMIC (EYE) PRN
Qty: 118 ML | Refills: 0 | Status: SHIPPED | OUTPATIENT
Start: 2025-03-19

## 2025-03-19 ASSESSMENT — ENCOUNTER SYMPTOMS: SORE THROAT: 1

## 2025-03-19 ASSESSMENT — VISUAL ACUITY: OU: 1

## 2025-03-19 ASSESSMENT — PAIN - FUNCTIONAL ASSESSMENT: PAIN_FUNCTIONAL_ASSESSMENT: 0-10

## 2025-03-19 ASSESSMENT — PAIN SCALES - GENERAL: PAINLEVEL_OUTOF10: 9

## 2025-03-19 NOTE — DISCHARGE INSTRUCTIONS
Thank You!    It was a pleasure taking care of you in our Emergency Department today. We know that when you come to our Emergency Department, you are entrusting us with your health, comfort, and safety. Our physicians and nurses honor that trust, and truly appreciate the opportunity to care for you and your loved ones.      We also value your feedback. If you receive a survey about your Emergency Department experience today, please fill it out.  We care about our patients' feedback, and we listen to what you have to say.  Thank you.    Corey Noland PA-C      ________________________________________________________________________  I have included a copy of your lab results and/or radiologic studies from today's visit so you can have them easily available at your follow-up visit. We hope you feel better and please do not hesitate to contact the ED if you have any questions at all!    Recent Results (from the past 12 hours)   COVID-19 & Influenza Combo    Collection Time: 03/19/25  3:47 PM    Specimen: Nasopharyngeal   Result Value Ref Range    Source Nasopharyngeal      SARS-CoV-2, PCR Not detected NOTD      Rapid Influenza A By PCR Not detected NOTD      Rapid Influenza B By PCR Not detected NOTD     Group A Strep by PCR    Collection Time: 03/19/25  3:47 PM    Specimen: Swab; Throat   Result Value Ref Range    Strep Grp A PCR Detected (A) NOTD         No orders to display     [unfilled]  The exam and treatment you received in the Emergency Department were for an urgent problem and are not intended as complete care. It is important that you follow up with a doctor, nurse practitioner, or physician assistant for ongoing care. If your symptoms become worse or you do not improve as expected and you are unable to reach your usual health care provider, you should return to the Emergency Department. We are available 24 hours a day.    Please take your discharge instructions with you when you go to your follow-up

## 2025-03-19 NOTE — ED TRIAGE NOTES
Pt ambulatory to triage. C/o sore throat, HA, and right ear pain since this morning with 1 episode of emesis last night prior to other symptoms. S/p similar symptoms that presented over a week ago but felt had resolved. Took tylenol ~ 10am. Aox4. Respirations regular, even, and unlabored on RA.

## 2025-03-19 NOTE — ED NOTES
Discharge instructions were given to the patient by Ivana Mallory RN.  The patient left the Emergency Department alert and oriented and in no acute distress with 3 prescription(s). The patient was encouraged to call or return to the ED for worsening issues or problems and was encouraged to schedule a follow up appointment for continuing care.  The patient verbalized understanding of discharge instructions and prescriptions; all questions were answered. The patient has no further concerns at this time.

## 2025-03-19 NOTE — ED PROVIDER NOTES
[facility] EMERGENCY DEPARTMENT  EMERGENCY DEPARTMENT ENCOUNTER       Pt Name: Xochilt Baptiste  MRN: [id number]  Birthdate 1985  Date of evaluation: 3/19/2025  Provider: PILY Cummings   PCP: No primary care provider on file.  Note Started: 3:50 PM EDT 3/19/25     CHIEF COMPLAINT       Chief Complaint   Patient presents with    Cold Symptoms    Pharyngitis    Headache    Ear Pain        HISTORY OF PRESENT ILLNESS: 1 or more elements      History From: Patient  HPI Limitations: None     Xochitl Baptiste is a 39 y.o. female who presents ambulatory with sore throat, right ear pain and bodyaches.  Symptoms started suddenly yesterday.  She describes no real cough.  There are no known sick contacts.  She tells me she is a  in a alf community and is due back to work tomorrow.  She takes no medications daily and has no known medication allergies.  She convincingly denies cigarette smoking.     Nursing Notes were all reviewed and agreed with or any disagreements were addressed in the HPI.     REVIEW OF SYSTEMS      Review of Systems   HENT:  Positive for ear pain and sore throat.    Musculoskeletal:  Positive for myalgias.        Positives and Pertinent negatives as per HPI.    PAST HISTORY     Past Medical History:  History reviewed. No pertinent past medical history.    Past Surgical History:  History reviewed. No pertinent surgical history.    Family History:  History reviewed. No pertinent family history.    Social History:       Allergies:  No Known Allergies    CURRENT MEDICATIONS      Previous Medications    No medications on file       SCREENINGS               No data recorded        PHYSICAL EXAM      ED Triage Vitals [03/19/25 1528]   Encounter Vitals Group      BP (!) 145/98      Systolic BP Percentile       Diastolic BP Percentile       Pulse (!) 107      Respirations 20      Temp 99.7 °F (37.6 °C)      Temp Source Oral      SpO2 97 %      Weight - Scale 84.8 kg (186 lb 15.2 oz)